# Patient Record
Sex: FEMALE | Race: WHITE
[De-identification: names, ages, dates, MRNs, and addresses within clinical notes are randomized per-mention and may not be internally consistent; named-entity substitution may affect disease eponyms.]

---

## 2017-05-06 ENCOUNTER — HOSPITAL ENCOUNTER (EMERGENCY)
Dept: HOSPITAL 74 - JER | Age: 82
Discharge: HOME | End: 2017-05-06
Payer: COMMERCIAL

## 2017-05-06 VITALS — HEART RATE: 69 BPM | DIASTOLIC BLOOD PRESSURE: 69 MMHG | SYSTOLIC BLOOD PRESSURE: 153 MMHG

## 2017-05-06 VITALS — BODY MASS INDEX: 32 KG/M2

## 2017-05-06 VITALS — TEMPERATURE: 97.8 F

## 2017-05-06 DIAGNOSIS — R26.2: ICD-10-CM

## 2017-05-06 DIAGNOSIS — M25.462: ICD-10-CM

## 2017-05-06 DIAGNOSIS — I10: ICD-10-CM

## 2017-05-06 DIAGNOSIS — Z85.3: ICD-10-CM

## 2017-05-06 DIAGNOSIS — M13.862: Primary | ICD-10-CM

## 2017-05-06 DIAGNOSIS — E03.9: ICD-10-CM

## 2017-05-06 LAB
ALBUMIN SERPL-MCNC: 3.7 G/DL (ref 3.4–5)
ALP SERPL-CCNC: 73 U/L (ref 45–117)
ALT SERPL-CCNC: 18 U/L (ref 12–78)
ANION GAP SERPL CALC-SCNC: 10 MMOL/L (ref 8–16)
AST SERPL-CCNC: 15 U/L (ref 15–37)
BASOPHILS # BLD: 0.9 % (ref 0–2)
BILIRUB SERPL-MCNC: 0.4 MG/DL (ref 0.2–1)
CALCIUM SERPL-MCNC: 9.3 MG/DL (ref 8.5–10.1)
CO2 SERPL-SCNC: 26 MMOL/L (ref 21–32)
COCKROFT - GAULT: 55.18
CREAT SERPL-MCNC: 0.9 MG/DL (ref 0.55–1.02)
DEPRECATED RDW RBC AUTO: 13.8 % (ref 11.6–15.6)
EOSINOPHIL # BLD: 2.4 % (ref 0–4.5)
GLUCOSE SERPL-MCNC: 96 MG/DL (ref 74–106)
INR BLD: 0.93 (ref 0.82–1.09)
MCH RBC QN AUTO: 29.4 PG (ref 25.7–33.7)
MCHC RBC AUTO-ENTMCNC: 33 G/DL (ref 32–36)
MCV RBC: 89.2 FL (ref 80–96)
NEUTROPHILS # BLD: 52.1 % (ref 42.8–82.8)
PLATELET # BLD AUTO: 217 K/MM3 (ref 134–434)
PMV BLD: 8.1 FL (ref 7.5–11.1)
PROT SERPL-MCNC: 7.7 G/DL (ref 6.4–8.2)
PT PNL PPP: 10.2 SEC (ref 9.98–11.88)
WBC # BLD AUTO: 8.2 K/MM3 (ref 4–10)

## 2017-05-06 PROCEDURE — 3E0333Z INTRODUCTION OF ANTI-INFLAMMATORY INTO PERIPHERAL VEIN, PERCUTANEOUS APPROACH: ICD-10-PCS | Performed by: EMERGENCY MEDICINE

## 2017-05-06 NOTE — PDOC
History of Present Illness





- General


History Source: Patient


Exam Limitations: No Limitations





- History of Present Illness


Initial Comments: 





05/06/17 01:23


The patient is a 87 year old female brought via EMS and presenting with her 

daughter, with a significant past medical history of hypothyroidism, 

hypertension and right breast CA s/p right lumpectomy, who presents to the 

emergency department with left knee pain and swelling. The patient notes that 

she has been having trouble bearing weight due to the leg pain. She denies any 

kind of fall. She describes her leg pain as moderate, without radiation. She 

notes that moving the extremity or attempting to bear weight on the extremity 

exacerbates her pain.  





The patient denies chest pain, shortness of breath, headache and dizziness. 

Denies fever, chills, nausea, vomit, diarrhea and constipation. Denies dysuria, 

frequency, urgency and hematuria. 





Allergies: Penicillin, dez, peaches


Past surgical history: Right lumpectomy 


Social history: No alcohol, tobacco or drug use reported 





<Devon Edwards - Last Filed: 05/06/17 02:07>





<Parrish Maria - Last Filed: 05/06/17 02:20>





<Michelle Condon - Last Filed: 05/06/17 06:52>





<Tere Correa - Last Filed: 05/06/17 10:34>





- General


Chief Complaint: Pain, Acute


Stated Complaint: PAIN


Time Seen by Provider: 05/06/17 00:39





Past History





<Devon Edwards - Last Filed: 05/06/17 02:07>





- Past Medical History


Cancer: Yes (BREAST, R LUMPECTOMY)


HTN: Yes


Thyroid Disease: Yes (hypo)





- Psycho/Social/Smoking Cessation Hx


Anxiety: No


Suicidal Ideation: No


Smoking History: Never smoked


Have you smoked in the past 12 months: No


Information on smoking cessation initiated: No


Hx Alcohol Use: No


Drug/Substance Use Hx: No


Substance Use Type: None





<Parrish Maria - Last Filed: 05/06/17 02:20>





<Michelle Condon - Last Filed: 05/06/17 06:52>





<Tere Correa - Last Filed: 05/06/17 10:34>





- Past Medical History


Allergies/Adverse Reactions: 


 Allergies











Allergy/AdvReac Type Severity Reaction Status Date / Time


 


Penicillins Allergy   Verified 05/06/17 00:43


 


nickel [Nickel] AdvReac Mild Rash Unverified 05/06/17 00:43


 


peniciliin Allergy Intermediate  Uncoded 05/06/17 00:43


 


Peaches  AdvReac Mild redness Uncoded 05/06/17 00:43





   Face  











Home Medications: 


Ambulatory Orders





Acetaminophen [Tylenol Extra Strength] 500 mg PO PRN 05/06/17 


Amlodipine Besylate [Norvasc -] 2.5 mg PO DAILY 05/06/17 


Levothyroxine [Synthroid -] 100 mcg PO DAILY 05/06/17 


Nebivolol HCl [Bystolic] 10 mg DAILY 05/06/17 











**Review of Systems





- Review of Systems


Able to Perform ROS?: Yes


Comments:: 





05/06/17 01:23


CONSTITUTIONAL: No fever, no chills, no fatigue


EYES: No visual changes


ENT: No ear pain, no sore throat


CARDIOVASCULAR: No chest pain, no palpitations


RESPIRATORY: No cough, no SOB


GI: No abdominal pain, no nausea, no vomiting, no constipation, no diarrhea


GENITOURINARY: No dysuria, no frequency, no hematuria


EXTREMITIES: (+) Left lower extremity pain and swelling. 


MUSKULOSKELETAL: No backpain, no joint pain, no myalgias


SKIN: No rash


NEURO: No headache











<Devon Edwards - Last Filed: 05/06/17 02:07>





*Physical Exam





- Vital Signs


 Last Vital Signs











Temp Pulse Resp BP Pulse Ox


 


 98.1 F   72   18   187/72   98 


 


 05/06/17 00:43  05/06/17 00:43  05/06/17 00:43  05/06/17 00:43  05/06/17 00:43














<Devon Edwards - Last Filed: 05/06/17 02:07>





- Vital Signs


 Last Vital Signs











Temp Pulse Resp BP Pulse Ox


 


 98.1 F   72   18   187/72   98 


 


 05/06/17 00:43  05/06/17 00:43  05/06/17 00:43  05/06/17 00:43  05/06/17 00:43














- Physical Exam


Comments: 





05/06/17 02:20








EXAMINATION 





CONSTITUTIONAL: Well-appearing; well-nourished; in no apparent distress


HEAD: Normocephalic; atraumatic


EYES: PERRL; EOM intact 


ENMT: External appears normal; normal oropharynx


NECK: Supple; non-tender; no cervical lymphadenopathy


CARD: Normal S1, S2; no murmurs, rubs, or gallops


RESP: Normal chest excursion with respiration; breath sounds clear and equal 

bilaterally; no wheezes, rhonchi, or rales


ABD: Soft, non-distended; non-tender; no palpable organomegaly, no palpable 

hernias


EXT: Left knee:  + There is a large suprapatellar effusion as well as mild 

effusion to the medial and lateral aspect of the knee joint, with tenderness to 

palpation along the medial and lateral aspects of the knee joint with no laxity 

with varus/Valgus; anterior drawers is negative; patient is unable to extend or 

flex the knee joint due to severe pain; there is normal passive range of motion 

at the left hip and ankle; distal pulses are intact bilaterally; right lower 

extremity: There is no soft tissue swelling or bony tenderness to palpation; 

full range of motion at the right hip, right knee and right ankle;


SKIN: Warm, dry, no rash


NEURO: No focal neurological deficiencies. 








<Parrish Maria - Last Filed: 05/06/17 02:20>





- Vital Signs


 Last Vital Signs











Temp Pulse Resp BP Pulse Ox


 


 98.1 F   72   18   187/72   98 


 


 05/06/17 00:43  05/06/17 00:43  05/06/17 00:43  05/06/17 00:43  05/06/17 00:43














<Michelle Condon - Last Filed: 05/06/17 06:52>





- Vital Signs


 Last Vital Signs











Temp Pulse Resp BP Pulse Ox


 


 97.8 F   68   18   129/79   99 


 


 05/06/17 06:51  05/06/17 08:43  05/06/17 08:43  05/06/17 08:43  05/06/17 08:43














<Tere Correa - Last Filed: 05/06/17 10:34>





ED Treatment Course





- LABORATORY


CBC & Chemistry Diagram: 


 05/06/17 01:50





 05/06/17 01:50





<Devon Edwards - Last Filed: 05/06/17 02:07>





- LABORATORY


CBC & Chemistry Diagram: 


 05/06/17 01:50





 05/06/17 01:50





<Yasmin Mariais - Last Filed: 05/06/17 02:20>





- LABORATORY


CBC & Chemistry Diagram: 


 05/06/17 01:50





 05/06/17 01:50





- ADDITIONAL ORDERS


Additional order review: 


 Laboratory  Results











  05/06/17 05/06/17 05/06/17





  01:50 01:50 01:50


 


INR    0.93


 


Sodium   141 


 


Potassium   4.4 


 


Chloride   105 


 


Carbon Dioxide   26 


 


Anion Gap   10 


 


BUN   22 H 


 


Creatinine   0.9  D 


 


Creat Clearance w eGFR   59.23 


 


Random Glucose   96 


 


Calcium   9.3 


 


Total Bilirubin   0.4  D 


 


AST   15 


 


ALT   18 


 


Alkaline Phosphatase   73  D 


 


Total Protein   7.7 


 


Albumin   3.7 


 


Blood Type  O POSITIVE  


 


Antibody Screen  Negative  








 











  05/06/17





  01:50


 


RBC  4.47


 


MCV  89.2


 


MCHC  33.0


 


RDW  13.8


 


MPV  8.1


 


Neutrophils %  52.1


 


Lymphocytes %  34.4


 


Monocytes %  10.2


 


Eosinophils %  2.4


 


Basophils %  0.9














- Medications


Given in the ED: 


ED Medications














Discontinued Medications














Generic Name Dose Route Start Last Admin





  Trade Name Freq  PRN Reason Stop Dose Admin


 


Acetaminophen  1,000 mg 05/06/17 01:16 05/06/17 01:58





  Ofirmev Injection -  IVPB 05/06/17 01:17  1,000 mg





  ONCE ONE   Administration














<Michelle Condon - Last Filed: 05/06/17 06:52>





- LABORATORY


CBC & Chemistry Diagram: 


 05/06/17 01:50





 05/06/17 01:50





- ADDITIONAL ORDERS


Additional order review: 


 Laboratory  Results











  05/06/17 05/06/17 05/06/17





  01:50 01:50 01:50


 


INR    0.93


 


Sodium   141 


 


Potassium   4.4 


 


Chloride   105 


 


Carbon Dioxide   26 


 


Anion Gap   10 


 


BUN   22 H 


 


Creatinine   0.9  D 


 


Creat Clearance w eGFR   59.23 


 


Random Glucose   96 


 


Calcium   9.3 


 


Total Bilirubin   0.4  D 


 


AST   15 


 


ALT   18 


 


Alkaline Phosphatase   73  D 


 


Total Protein   7.7 


 


Albumin   3.7 


 


Blood Type  O POSITIVE  


 


Antibody Screen  Negative  








 











  05/06/17





  01:50


 


RBC  4.47


 


MCV  89.2


 


MCHC  33.0


 


RDW  13.8


 


MPV  8.1


 


Neutrophils %  52.1


 


Lymphocytes %  34.4


 


Monocytes %  10.2


 


Eosinophils %  2.4


 


Basophils %  0.9














- Medications


Given in the ED: 


ED Medications














Discontinued Medications














Generic Name Dose Route Start Last Admin





  Trade Name David  PRN Reason Stop Dose Admin


 


Acetaminophen  1,000 mg 05/06/17 01:16 05/06/17 01:58





  Ofirmev Injection -  IVPB 05/06/17 01:17  1,000 mg





  ONCE ONE   Administration


 


Ketorolac Tromethamine  30 mg 05/06/17 03:26 05/06/17 03:30





  Toradol Injection -  IVPUSH 05/06/17 03:27  30 mg





  ONCE ONE   Administration


 


Lorazepam  1 mg 05/06/17 06:18 05/06/17 07:07





  Ativan Injection -  IVPUSH 05/06/17 06:19  1 mg





  ONCE ONE   Administration














<Tere Correa - Last Filed: 05/06/17 10:34>





Medical Decision Making





- Medical Decision Making





05/06/17 02:23


Patient is an elderly female who presents with atraumatic pain and swelling to 

the left knee, with inability to bear weight on the day of arrival. Physical 

evaluation reveals large suprapatellar effusion and and smaller medial and 

lateral effusions.  Will obtain left knee x-ray as well as left hip and pelvis x

-rays to rule out fracture. We'll administer IV Tylenol. Patient will likely 

require admission for evaluation of knee derangement and inability to ambulate.





<Parrish Maria - Last Filed: 05/06/17 02:20>





- Medical Decision Making





05/06/17 06:52


I received patient on signout.  She has left knee pain that has left her unable 

to ambulate. Knee XR shows some fluid in the knee. No fractures. Pt has warmth 

at the right knee and pain at the right knee, but no systemic fevers and no 

cellulitis of the right knee. Pt is awaiting CT scan result. She is also 

awaiting ultrasound this AM to r/o baker cyst, DVT.


She may require admission for inability to ambulate. 


Or she will require  for placement in a rehab center for knee pain 

and inability to ambulate. 


She will be signed out to the day ER team.





<Michelle Condon - Last Filed: 05/06/17 06:52>





- Medical Decision Making





05/06/17 10:33


MicroBlogged Hospitalist. 





<Tere Correa - Last Filed: 05/06/17 10:34>





*DC/Admit/Observation/Transfer





- Attestations


Scribe Attestion: 





05/06/17 01:24


Documentation prepared by Devon Edwards, acting as medical scribe for 

Parrish Maria MD





<Devon Edwards - Last Filed: 05/06/17 02:07>





- Attestations


Physician Attestion: 





05/06/17 02:20


The documentation was prepared by the scribe under my direct supervision. I 

have reviewed the documentation which correctly represents the findings, 

medical decision-making and critical action taken by me.





<Parrish Maria - Last Filed: 05/06/17 02:20>





<Michelle Condon - Last Filed: 05/06/17 06:52>





- Attestations


Scribe Attestion: 


05/06/17 10:33


Documentation prepared by Tere Correa, acting as medical scribe for 

Michelle Condon MD.





<Tere Correa - Last Filed: 05/06/17 10:34>


Diagnosis at time of Disposition: 


 Knee arthropathy, Inability to ambulate due to knee





- Discharge Dispostion


Condition at time of disposition: Guarded





- Referrals


Referrals: 


Jan Laird MD [Primary Care Provider] -

## 2017-05-06 NOTE — PDOC
*Physical Exam





- Vital Signs


 Last Vital Signs











Temp Pulse Resp BP Pulse Ox


 


 97.8 F   68   18   129/79   99 


 


 05/06/17 06:51  05/06/17 08:43  05/06/17 08:43  05/06/17 08:43  05/06/17 08:43














ED Treatment Course





- LABORATORY


CBC & Chemistry Diagram: 


 05/06/17 01:50





 05/06/17 01:50





- ADDITIONAL ORDERS


Additional order review: 


 Laboratory  Results











  05/06/17 05/06/17 05/06/17





  01:50 01:50 01:50


 


INR    0.93


 


Sodium   141 


 


Potassium   4.4 


 


Chloride   105 


 


Carbon Dioxide   26 


 


Anion Gap   10 


 


BUN   22 H 


 


Creatinine   0.9  D 


 


Creat Clearance w eGFR   59.23 


 


Random Glucose   96 


 


Calcium   9.3 


 


Total Bilirubin   0.4  D 


 


AST   15 


 


ALT   18 


 


Alkaline Phosphatase   73  D 


 


Total Protein   7.7 


 


Albumin   3.7 


 


Blood Type  O POSITIVE  


 


Antibody Screen  Negative  








 











  05/06/17





  01:50


 


RBC  4.47


 


MCV  89.2


 


MCHC  33.0


 


RDW  13.8


 


MPV  8.1


 


Neutrophils %  52.1


 


Lymphocytes %  34.4


 


Monocytes %  10.2


 


Eosinophils %  2.4


 


Basophils %  0.9














- Medications


Given in the ED: 


ED Medications














Discontinued Medications














Generic Name Dose Route Start Last Admin





  Trade Name Gilq  PRN Reason Stop Dose Admin


 


Acetaminophen  1,000 mg 05/06/17 01:16 05/06/17 01:58





  Ofirmev Injection -  IVPB 05/06/17 01:17  1,000 mg





  ONCE ONE   Administration


 


Ketorolac Tromethamine  30 mg 05/06/17 03:26 05/06/17 03:30





  Toradol Injection -  IVPUSH 05/06/17 03:27  30 mg





  ONCE ONE   Administration


 


Lorazepam  1 mg 05/06/17 06:18 05/06/17 07:07





  Ativan Injection -  IVPUSH 05/06/17 06:19  1 mg





  ONCE ONE   Administration














Progress Note





- Progress Note


Progress Note: 


This patient was endorsed to me at 7 AM by Dr. Oshea pending results of lower 

extremity ultrasound, CT scan of the knee and reevaluation. Those studies are 

all negative for acute traumatic injury or any findings. There is a small cyst 

in the popliteal fossa. I have spoken with the patient and her daughter and 

have reevaluated the patient personally this morning. The patient was able to 

ambulate with some assistance but complained of persistent pain. Social work 

was called for evaluation for surfaces and possible placement.





Addendum:  NH placement is not a viable option for the family at this time.  

The plan is to discharge the patient home with Rx for ibuprofen.  Will refer to 

ortho as outpatient.  Return to the ED if S xpersist, worsen or new Sx arise.





*DC/Admit/Observation/Transfer


Diagnosis at time of Disposition: 


 Knee arthropathy, Inability to ambulate due to knee





- Discharge Dispostion


Disposition: HOME


Condition at time of disposition: Stable


Admit: No





- Referrals


Referrals: 


Jan Laird MD [Primary Care Provider] - 





- Patient Instructions


Printed Discharge Instructions:  [facility] for Knee Pain


Additional Instructions: 


You may take ibuprofen 600 mg every 6-8 hours as needed for pain in your knee. 

He may follow-up with Dr. Barton (orthopedics)call for an appointment at [phone]. Return to the emergency department if your symptoms persist, worsen, or 

new symptoms arise.





- Post Discharge Activity

## 2017-05-08 ENCOUNTER — HOSPITAL ENCOUNTER (OUTPATIENT)
Dept: HOSPITAL 74 - FER | Age: 82
Setting detail: OBSERVATION
LOS: 2 days | Discharge: HOME HEALTH | End: 2017-05-10
Attending: NURSE PRACTITIONER | Admitting: INTERNAL MEDICINE
Payer: COMMERCIAL

## 2017-05-08 VITALS — BODY MASS INDEX: 29.6 KG/M2

## 2017-05-08 DIAGNOSIS — Z85.3: ICD-10-CM

## 2017-05-08 DIAGNOSIS — R13.10: ICD-10-CM

## 2017-05-08 DIAGNOSIS — R26.2: ICD-10-CM

## 2017-05-08 DIAGNOSIS — M17.12: Primary | ICD-10-CM

## 2017-05-08 DIAGNOSIS — K57.00: ICD-10-CM

## 2017-05-08 DIAGNOSIS — E03.9: ICD-10-CM

## 2017-05-08 DIAGNOSIS — I10: ICD-10-CM

## 2017-05-08 DIAGNOSIS — M35.3: ICD-10-CM

## 2017-05-08 LAB
ALBUMIN SERPL-MCNC: 3.8 G/DL (ref 3.5–5)
ALP SERPL-CCNC: 49 U/L (ref 32–92)
ALT SERPL-CCNC: 12 U/L (ref 10–40)
ANION GAP SERPL CALC-SCNC: 8 MMOL/L (ref 8–16)
AST SERPL-CCNC: 19 U/L (ref 10–42)
BASOPHILS # BLD: 1.2 % (ref 0–2)
BILIRUB SERPL-MCNC: 0.4 MG/DL (ref 0.2–1)
CALCIUM SERPL-MCNC: 9.3 MG/DL (ref 8.4–10.2)
CO2 SERPL-SCNC: 24 MMOL/L (ref 22–28)
COCKROFT - GAULT: 57.47
COLOR UR: YELLOW
CREAT SERPL-MCNC: 0.8 MG/DL (ref 0.6–1.3)
DEPRECATED RDW RBC AUTO: 13.3 % (ref 11.6–15.6)
EOSINOPHIL # BLD: 3.6 % (ref 0–4.5)
GLUCOSE SERPL-MCNC: 90 MG/DL (ref 74–106)
MCH RBC QN AUTO: 29.9 PG (ref 25.7–33.7)
MCHC RBC AUTO-ENTMCNC: 33.9 G/DL (ref 32–36)
MCV RBC: 88.3 FL (ref 80–96)
NEUTROPHILS # BLD: 57.5 % (ref 42.8–82.8)
PH UR: 5.5 [PH] (ref 4.5–8)
PLATELET # BLD AUTO: 250 K/MM3 (ref 134–434)
PMV BLD: 7.9 FL (ref 7.5–11.1)
PROT SERPL-MCNC: 7.5 G/DL (ref 6.4–8.3)
SP GR UR: 1.02 (ref 1–1.02)
UROBILINOGEN UR STRIP-MCNC: (no result) MG/DL (ref 0.2–1)
WBC # BLD AUTO: 8.8 K/MM3 (ref 4–10.8)

## 2017-05-08 PROCEDURE — 3E013GC INTRODUCTION OF OTHER THERAPEUTIC SUBSTANCE INTO SUBCUTANEOUS TISSUE, PERCUTANEOUS APPROACH: ICD-10-PCS | Performed by: NURSE PRACTITIONER

## 2017-05-08 PROCEDURE — 3E0333Z INTRODUCTION OF ANTI-INFLAMMATORY INTO PERIPHERAL VEIN, PERCUTANEOUS APPROACH: ICD-10-PCS | Performed by: NURSE PRACTITIONER

## 2017-05-08 PROCEDURE — G0378 HOSPITAL OBSERVATION PER HR: HCPCS

## 2017-05-08 RX ADMIN — AMLODIPINE BESYLATE SCH MG: 2.5 TABLET ORAL at 22:24

## 2017-05-08 NOTE — PDOC
History of Present Illness





- General


Chief Complaint: Pain


Stated Complaint: LEFT KNEE PAIN X 1 WK


Time Seen by Provider: 05/08/17 12:43


History Source: Patient, Family


Exam Limitations: No Limitations





- History of Present Illness


Initial Comments: 





05/08/17 13:47





87-year-old female history of thyroid disorder, hypertension, right breast CA 

status post right lumpectomy presents with persistent left knee pain. Patient 

was here on May 4 after potentially twisting her left knee. Was noted to have 

left knee swelling and pain which in ultrasound, x-ray and CAT scan the knee 

was obtained. Findings found a joint effusion but no other findings. Patient 

was discharged. However, patient has been unable to actively or bear weight on 

the left knee. The patient's daughter is having difficulty caring for the 

patient. Reports that given the persistent pain and difficulty care, patient 

came to the ED for further evaluation.





Past History





- Past Medical History


Allergies/Adverse Reactions: 


 Allergies











Allergy/AdvReac Type Severity Reaction Status Date / Time


 


Penicillins Allergy   Verified 05/08/17 12:43


 


nickel [Nickel] AdvReac Mild Rash Verified 05/08/17 12:43


 


peniciliin Allergy Intermediate  Uncoded 05/08/17 12:43


 


Peaches  AdvReac Mild redness Uncoded 05/08/17 12:43





   Face  











Home Medications: 


Ambulatory Orders





Acetaminophen [Tylenol Extra Strength] 500 mg PO PRN 05/06/17 


Amlodipine Besylate [Norvasc -] 2.5 mg PO DAILY 05/06/17 


Levothyroxine [Synthroid -] 100 mcg PO DAILY 05/06/17 


Nebivolol HCl [Bystolic] 10 mg DAILY 05/06/17 








Cancer: Yes (BREAST, R LUMPECTOMY)


HTN: Yes


Thyroid Disease: Yes (hypo)





- Immunization History


Immunization Up to Date: Yes





- Psycho/Social/Smoking Cessation Hx


Anxiety: No


Suicidal Ideation: No


Smoking History: Never smoked


Have you smoked in the past 12 months: No


Hx Alcohol Use: Yes (RARE)


Drug/Substance Use Hx: No


Substance Use Type: None





**Review of Systems





- Review of Systems


Able to Perform ROS?: Yes


Comments:: 





05/08/17 13:50





GENERAL/CONSTITUTIONAL: No fever, weakness. 


HEAD, EYES, EARS, NOSE AND THROAT: No change in vision. No ear pain or 

discharge. No sore throat.


CARDIOVASCULAR: No chest pain or shortness of breath.


RESPIRATORY: No cough, wheezing, or hemoptysis.


GASTROINTESTINAL: No abdominal pain, nausea, vomiting, diarrhea, or decreased 

PO intolerance. 


GENITOURINARY: No dysuria, frequency, or change in urination.


MUSCULOSKELETAL: +left knee pain and swelling


SKIN: No rash


NEUROLOGIC: No headache, vertigo, loss of consciousness, or change in strength/

sensation.


ENDOCRINE: No increased thirst. No abnormal weight change.


HEMATOLOGIC/LYMPHATIC: No anemia, easy bleeding, or history of blood clots.


ALLERGIC/IMMUNOLOGIC: No hives or skin allergy. 





*Physical Exam





- Vital Signs


 Last Vital Signs











Temp Pulse Resp BP Pulse Ox


 


 97.9 F   63   16   152/65   95 


 


 05/08/17 12:42  05/08/17 12:42  05/08/17 12:42  05/08/17 12:42  05/08/17 12:42














- Physical Exam


Comments: 





05/08/17 13:50





GENERAL: Awake, alert, and fully oriented, in no acute distress. 


HEAD: No signs of trauma


EYES: PERRLA, EOMI, sclera anicteric, conjunctiva clear


ENT: Auricles normal inspection, hearing grossly normal, nares patent, 

oropharynx clear without exudates.  


NECK: Normal ROM, supple, no lymphadenopathy, JVD, or masses


LUNGS: Breath sounds equal, clear to auscultation bilaterally.  No wheezes, and 

no crackles


HEART: Regular rate and rhythm, normal S1 and S2, no murmurs, rubs or gallops


ABDOMEN: Soft, nontender, normoactive bowel sounds.  No guarding, no rebound.  

No masses


EXTREMITIES: Normal range of motion, no edema.  No clubbing or cyanosis. No 

cords, erythema, or tenderness


LLE: 2+ DP pulse. sensation intact throughout. Joint effusion appreciated. Able 

to flex and extend approx 45 degrees without difficult. Not warm or red to 

touch. Varus and valgus stress negative. Anterior/Posterior drawer test 

negative.


NEUROLOGICAL: Cranial nerves II through XII grossly intact.  Normal speech, 

normal gait


SKIN: Warm, Dry, normal turgor, no rashes or lesions noted.





**Heart Score/ECG Review


  ** #1


ECG reviewed & interpreted by me at: 13:50





05/08/17 15:19


NSR 59, TWI III, avF, TWI V3, no std/tierra,  msec





ED Treatment Course





- LABORATORY


CBC & Chemistry Diagram: 


 05/08/17 13:45





 05/08/17 13:45





Medical Decision Making





- Medical Decision Making


05/08/17 13:52





 Vital Signs











Temp Pulse Resp BP Pulse Ox


 


 97.9 F   63   16   152/65   95 


 


 05/08/17 12:42  05/08/17 12:42  05/08/17 12:42  05/08/17 12:42  05/08/17 12:42








Pt unable to bear weight. Patient will be unable to care at home without 

support. Will need admission for PT, ortho and possible SNF evaluation.





05/08/17 15:25





 CBC, BMP





 05/08/17 13:45 





 05/08/17 13:45 





 CMP











Sodium  138 mmol/L (136-145)   05/08/17  13:45    


 


Potassium  4.6 mmol/L (3.5-5.1)   05/08/17  13:45    


 


Chloride  106 mmol/L ()   05/08/17  13:45    


 


Carbon Dioxide  24 mmol/L (22-28)   05/08/17  13:45    


 


Anion Gap  8  (8-16)   05/08/17  13:45    


 


BUN  20 mg/dl (7-18)  H D 05/08/17  13:45    


 


Creatinine  0.8 mg/dl (0.6-1.3)   05/08/17  13:45    


 


Creat Clearance w eGFR  > 60  (>60)   05/08/17  13:45    


 


Random Glucose  90 mg/dl ()   05/08/17  13:45    


 


Calcium  9.3 mg/dl (8.4-10.2)   05/08/17  13:45    


 


Total Bilirubin  0.4 mg/dl (0.2-1.0)  D 05/08/17  13:45    


 


AST  19 U/L (10-42)   05/08/17  13:45    


 


ALT  12 U/L (10-40)   05/08/17  13:45    


 


Alkaline Phosphatase  49 U/L (32-92)   05/08/17  13:45    


 


Total Protein  7.5 g/dl (6.4-8.3)   05/08/17  13:45    


 


Albumin  3.8 g/dl (3.5-5.0)   05/08/17  13:45    








Case discussed with DR. Laird. He agrees that the patient needs ortho, and PT 

evaluation.


Case discussed with DR. Yñaez. She accepts the patient to med/surg obs to her 

service.





Case discussed in detail with admitting physician including history, physical 

exam and ancillary studies.





Admitting physician has assumed care for the patient, will follow all pending 

diagnostics and will complete the evaluation and treatment.  





*DC/Admit/Observation/Transfer


Diagnosis at time of Disposition: 


 Inability to ambulate due to knee





- Discharge Dispostion


Condition at time of disposition: Stable


Admit: Yes

## 2017-05-09 RX ADMIN — NAPROXEN SCH MG: 500 TABLET ORAL at 21:20

## 2017-05-09 RX ADMIN — ANALGESIC BALM SCH APPLIC: 1.74; 4.06 OINTMENT TOPICAL at 21:21

## 2017-05-09 RX ADMIN — LEVOTHYROXINE SODIUM SCH MCG: 100 TABLET ORAL at 06:23

## 2017-05-09 RX ADMIN — ANALGESIC BALM SCH 500S: 1.74; 4.06 OINTMENT TOPICAL at 09:25

## 2017-05-09 RX ADMIN — RANITIDINE SCH MG: 150 TABLET ORAL at 21:20

## 2017-05-09 RX ADMIN — NEBIVOLOL HYDROCHLORIDE SCH MG: 10 TABLET ORAL at 10:00

## 2017-05-09 RX ADMIN — RANITIDINE SCH MG: 150 TABLET ORAL at 12:45

## 2017-05-09 RX ADMIN — HEPARIN SODIUM SCH UNIT: 5000 INJECTION, SOLUTION INTRAVENOUS; SUBCUTANEOUS at 21:20

## 2017-05-09 RX ADMIN — AMLODIPINE BESYLATE SCH MG: 2.5 TABLET ORAL at 21:21

## 2017-05-09 NOTE — PN
Physical Exam: 


SUBJECTIVE: Patient seen and examined, reports pain to the left knee upon 

movement.  





OBJECTIVE: patient is a 87 year old female with a past medical history of HTN, 

hypothyroid, R breast CA s/p lumpectomy.  patient was admitted from the 

emergency department to observation for left knee pain and the inability to 

ambulate.  





 Vital Signs











 Period  Temp  Pulse  Resp  BP Sys/Hui  Pulse Ox


 


 Last 24 Hr  97.4 F-99.4 F  63-66  18-20  135-173/50-76  97-98











GENERAL: The patient is awake, alert, and fully oriented, in no acute distress.


HEAD: Normal with no signs of trauma.


EYES: PERRL, extraocular movements intact, sclera anicteric, conjunctiva clear. 

No ptosis. 


ENT: Ears normal, nares patent, oropharynx clear without exudates, moist mucous 


membranes.


NECK: Trachea midline, full range of motion, supple. 


LUNGS: Breath sounds equal, clear to auscultation bilaterally, no wheezes, no 

crackles, no 


accessory muscle use. 


HEART: Regular rate and rhythm, S1, S2 without murmur, rub or gallop.


ABDOMEN: Soft, nontender, nondistended, normoactive bowel sounds, no guarding, 

no 


rebound, no hepatosplenomegaly, no masses.


EXTREMITIES: 2+ pulses, warm, well-perfused, no edema. multiple varicosities 

noted


LEFT LOWER EXTREMITY: pain  upon flexion and extension of left knee, +milagro'

s test 


NEUROLOGICAL: Cranial nerves II through XII grossly intact. Normal speech, gait 

not 


observed.


PSYCH: Normal mood, normal affect.


SKIN: Warm, dry, normal turgor, no rashes or lesions noted





               Active Medications











Generic Name Dose Route Start Last Admin





  Trade Name Freq  PRN Reason Stop Dose Admin


 


Acetaminophen  650 mg 05/08/17 21:37 05/08/17 22:24





  Tylenol -  PO   650 mg





  Q6H PRN   Administration





  FEVER OR PAIN   


 


Amlodipine Besylate  2.5 mg 05/08/17 22:00 05/08/17 22:24





  Norvasc -  PO   2.5 mg





  HS JACOB   Administration


 


Artificial Tears  1 drop 05/08/17 21:38  





  Artificial Tears  OU   





  QID PRN   





  DRY EYES   


 


Heparin Sodium (Porcine)  5,000 unit 05/09/17 22:00  





  Heparin -  SQ   





  TID JACOB   


 


Levothyroxine Sodium  100 mcg 05/09/17 07:00 05/09/17 06:23





  Synthroid -  PO   100 mcg





  DAILY@0700 JACOB   Administration


 


Methyl Salicylate  1 applic 05/09/17 10:00  





  Shashi-Brunner -  TP   





  BID JACOB   


 


Nebivolol  10 mg 05/09/17 10:00  





  Bystolic -  PO   





  DAILY JACOB   








IMAGING





5/6/17: US/DUPLEX VASCUL US-1 LEG HISTORY PROVIDED: IMPRESSION: No evidence of 

deep venous thrombosis. 





5/6/17: CT/LOWER EXTREMITY CT W/O CONTR IMPRESSION: Mild degenerative arthritis 

with small suprapatellar effusion. There is no evidence of fracture, 

dislocation or acute bone or joint abnormalities. 





5/6/17: RAD/HIP PELVIS-LEFT Pelvis and left hip:  Impression: No acute 

pathology.





5/6/17: RAD/KNEE 2 POS-LEFT Left knee: Impression : Joint effusion. No acute 

fracture appreciated.





5/8/17: RAD/CHEST X-RAY PORTABLE*  Impression: No significant interval change 

or acute lung disease is present. 





ASSESSMENT/PLAN:





1)L knee osteoarthritis with severe pain


- pending ortho consult


- pt eval


- continue prn tylenol 


      


2) HTN


- continue norvasc, home dose


- b/p at goal 


   


3) Hypothyroid


- continue synthroid 





F/E/N


- low sodium diet


- replete lytes prn





PPX


- heparin sq tid


- pt eval


- scd/adarsh








Dispo: Pt currently requires inpatient observation for management of her 

emergent condition.








Visit type





- Emergency Visit


Emergency Visit: Yes


ED Registration Date: 05/08/17


Care time: The patient presented to the Emergency Department on the above date 

and was hospitalized for further evaluation of their emergent condition.





- New Patient


This patient is new to me today: Yes


Date on this admission: 05/09/17





- Critical Care


Critical Care patient: No





- Discharge Referral


Referred to [facility] No

## 2017-05-09 NOTE — CONSULT
Consult


Reason for Consultation:: Left knee





- History of Present Illness


Chief Complaint: Left knee pain 1 month


History of Present Illness: 


87-year-old female complains of left knee pain for approximately 1 month.  She 

states over the past few weeks this pain is worse and a few days ago she 

twisted the knee and was having a lot of pain and can barely walk. she went to 

the emergency room and had x-rays and CAT scan of the knee and was feeling 

better and was discharged home.  She is feeling a little better the past few 

days but the pain got worse again yesterday and she went back to the emergency 

room and was admitted.  Today the knee is feeling a little better and she was 

able to walk the hallway and back with a walker and help of the physical 

therapist.  She did have a similar problem with right knee a few years ago 

which eventually resolved after a few weeks.  She has not been taking any anti-

inflammatories for the knee.There are no other associated, aggravating or 

relieving factors. 





- History Source


History Provided By: Patient, Family Member, Medical Record


Limitations to Obtaining History: No Limitations





- Past Medical History


Cardio/Vascular: Yes: HTN, Hyperlipdemia


Gastrointestinal: Yes: Other (Dysphagia, being worked up by GI.)


...Pregnant: No


Musculoskeletal: Yes: Osteoarthritis


Endocrine: Yes: Hypothyroidism





- Past Surgical History


Past Surgical History: Yes: Breast Biopsy (History of right breast carcinome in 

1996 treated with lumpectomy, radiotherapy and Tamoxiphen)





- Alcohol/Substance Use


Hx Alcohol Use: Yes (RARE)





- Smoking History


Smoking history: Never smoked


Have you smoked in the past 12 months: No





- Social History


ADL: Independent


History of Recent Travel: No





Home Medications





- Allergies


Allergies/Adverse Reactions: 


 Allergies











Allergy/AdvReac Type Severity Reaction Status Date / Time


 


cortisone Allergy Intermediate Swelling Verified 05/08/17 20:29


 


Penicillins Allergy   Verified 05/08/17 12:43


 


nickel [Nickel] AdvReac Mild Rash Verified 05/08/17 12:43


 


peniciliin Allergy Intermediate  Uncoded 05/08/17 12:43


 


Peaches  AdvReac Mild redness Uncoded 05/08/17 12:43





   Face  














- Home Medications


Home Medications: 


Ambulatory Orders





Acetaminophen [Tylenol Extra Strength] 500 mg PO PRN 05/06/17 


Amlodipine Besylate [Norvasc -] 2.5 mg PO DAILY 05/06/17 


Levothyroxine [Synthroid -] 100 mcg PO DAILY 05/06/17 


Nebivolol HCl [Bystolic] 10 mg DAILY 05/06/17 











Family Disease History





- Family Disease History


Family Disease History: Diabetes: Brother, CA: Sister (Colon cancer)





Review of Systems





- Review of Systems


Constitutional: reports: No Symptoms


Eyes: reports: No Symptoms


HENT: reports: No Symptoms


Neck: reports: No Symptoms


Cardiovascular: reports: No Symptoms


Respiratory: reports: No Symptoms


Gastrointestinal: reports: No Symptoms


Genitourinary: reports: No Symptoms


Breasts: reports: No Symptoms Reported


Musculoskeletal: reports: No Symptoms


Integumentary: reports: No Symptoms


Neurological: reports: No Symptoms


Endocrine: reports: No Symptoms


Hematology/Lymphatic: reports: No Symptoms


Psychiatric: reports: No Symptoms





Physical Exam


Vital Signs: 


 Vital Signs











Temperature  99.4 F   05/09/17 06:00


 


Pulse Rate  63   05/09/17 06:00


 


Respiratory Rate  20   05/09/17 06:00


 


Blood Pressure  135/76   05/09/17 06:00


 


O2 Sat by Pulse Oximetry (%)  98   05/09/17 06:00











Constitutional: Yes: Well Nourished, No Distress, Calm


HENT: Yes: Atraumatic, Normocephalic


Musculoskeletal: Yes: Other (Left knee: No open wounds.  Moderate joint 

effusion.  No erythema or ecchymosis.  Mild diffuse tenderness over the medial 

lateral joint spaces.  No other areas of tenderness.  Range of motion of the 

knee is 10 to 90. quadriceps is intact.  Calf nontender.  Negative Homans 

sign.  Compartments soft.)





Imaging





- Results


X-ray: Report Reviewed, Image Reviewed


Cat Scan: Report Reviewed, Image Reviewed (Severe degenerative changes. No 

fractures or dislocations)





Assessment/Plan


#1 left knee pain





Discussed today's findings and treatment options with the patient.  The knee is 

feeling a little better today and I recommended starting a course of oral anti-

inflammatories as well as a supportive wrap for the knee. I have recommended an 

NSAID with an antacid to minimize gastric irritation. We have discussed the 

possibility of doing a cortisone injection but she would like to hold off for 

now.  Follow-up with Dr. Chino/Dr. Zamarripa in 1 week as outpatient. She will 

also rest and ice the knee.

## 2017-05-09 NOTE — EKG
Test Reason : 

Blood Pressure : ***/*** mmHG

Vent. Rate : 059 BPM     Atrial Rate : 059 BPM

   P-R Int : 172 ms          QRS Dur : 084 ms

    QT Int : 442 ms       P-R-T Axes : 022 -13 -21 degrees

   QTc Int : 437 ms

 

SINUS BRADYCARDIA

CANNOT RULE OUT ANTERIOR INFARCT , AGE UNDETERMINED

NONSPECIFIC T WAVE ABNORMALITY

WHEN COMPARED WITH ECG OF 03-AUG-2007 07:34,

T WAVE INVERSION NOW EVIDENT IN INFERIOR LEADS

T WAVE INVERSION NO LONGER EVIDENT IN ANTERIOR LEADS

QT HAS SHORTENED

Confirmed by MD SANDRA, JOSE LUIS ([id number]) on 5/9/2017 9:04:32 AM

 

Referred By:  ZAIDA           Confirmed By:JOSE LUIS FLORES MD

## 2017-05-10 VITALS — SYSTOLIC BLOOD PRESSURE: 155 MMHG | HEART RATE: 66 BPM | TEMPERATURE: 98.9 F | DIASTOLIC BLOOD PRESSURE: 57 MMHG

## 2017-05-10 RX ADMIN — RANITIDINE SCH MG: 150 TABLET ORAL at 10:07

## 2017-05-10 RX ADMIN — ANALGESIC BALM SCH APPLIC: 1.74; 4.06 OINTMENT TOPICAL at 10:07

## 2017-05-10 RX ADMIN — NEBIVOLOL HYDROCHLORIDE SCH MG: 10 TABLET ORAL at 10:07

## 2017-05-10 RX ADMIN — NAPROXEN SCH MG: 500 TABLET ORAL at 10:07

## 2017-05-10 RX ADMIN — LEVOTHYROXINE SODIUM SCH MCG: 100 TABLET ORAL at 06:42

## 2017-05-10 RX ADMIN — HEPARIN SODIUM SCH UNIT: 5000 INJECTION, SOLUTION INTRAVENOUS; SUBCUTANEOUS at 06:42

## 2017-05-10 NOTE — DS
Physical Exam: 


SUBJECTIVE: Patient seen and examined, patient reports feeling better, reports 

pain improved after starting naproxyn.  





OBJECTIVE: patient is a 87 year old female with a past medical history of HTN, 

hypothyroid, R breast CA s/p lumpectomy presented with left knee pain. Pt 

presented to the ED on 5/6/17 with same complaint. Pt reports she was in her 

kitchen and went to turn around, twisting her left leg with sudden onset of 

pain persisting since. Pt underwent L hip/pelvis and knee xrays without 

fracture. CT left knee without fracture, + suprapatellar effusion. US negative 

for DVT. Pt has been unable to weight bear since event and daughter unable to 

manage mother at home. 





 Vital Signs











 Period  Temp  Pulse  Resp  BP Sys/Hui  Pulse Ox


 


 Last 24 Hr  97.6 F-98.8 F  60-64  16-19  127-158/59-63  97-98








PHYSICAL EXAM





GENERAL: The patient is awake, alert, and fully oriented, in no acute distress.


HEAD: Normal with no signs of trauma.


EYES: PERRL, extraocular movements intact, sclera anicteric, conjunctiva clear. 

No ptosis. 


ENT: Ears normal, nares patent, oropharynx clear without exudates, moist mucous 


membranes.


NECK: Trachea midline, full range of motion, supple. 


LUNGS: Breath sounds equal, clear to auscultation bilaterally, no wheezes, no 

crackles, no 


accessory muscle use. 


HEART: Regular rate and rhythm, S1, S2 without murmur, rub or gallop.


ABDOMEN: Soft, nontender, nondistended, normoactive bowel sounds, no guarding, 

no 


rebound, no hepatosplenomegaly, no masses.


EXTREMITIES: 2+ pulses, warm, well-perfused, no edema. multiple varicosities 

noted


LEFT LOWER EXTREMITY: pain  upon flexion and extension of left knee, +milagro'

s test 


NEUROLOGICAL: Cranial nerves II through XII grossly intact. Normal speech, gait 

not 


observed.


PSYCH: Normal mood, normal affect.


SKIN: Warm, dry, normal turgor, no rashes or lesions noted





LABS





 CBC











WBC  8.8 K/mm3 (4.0-10.8)   05/08/17  13:45    


 


RBC  4.78 M/mm3 (3.60-5.2)   05/08/17  13:45    


 


Hgb  14.3 GM/dl (10.7-15.3)   05/08/17  13:45    


 


Hct  42.2 % (32.4-45.2)   05/08/17  13:45    


 


MCV  88.3 fl (80-96)   05/08/17  13:45    


 


MCHC  33.9 g/dl (32.0-36.0)   05/08/17  13:45    


 


RDW  13.3 % (11.6-15.6)   05/08/17  13:45    


 


Plt Count  250 K/MM3 (134-434)   05/08/17  13:45    


 


MPV  7.9 fl (7.5-11.1)   05/08/17  13:45    


 


Neutrophils %  57.5 % (42.8-82.8)   05/08/17  13:45    


 


Lymphocytes %  28.8 % (8-40)   05/08/17  13:45    


 


Monocytes %  8.9 % (3.8-10.2)   05/08/17  13:45    


 


Eosinophils %  3.6 % (0-4.5)   05/08/17  13:45    


 


Basophils %  1.2 % (0-2.0)   05/08/17  13:45    








 CMP











Sodium  138 mmol/L (136-145)   05/08/17  13:45    


 


Potassium  4.6 mmol/L (3.5-5.1)   05/08/17  13:45    


 


Chloride  106 mmol/L ()   05/08/17  13:45    


 


Carbon Dioxide  24 mmol/L (22-28)   05/08/17  13:45    


 


Anion Gap  8  (8-16)   05/08/17  13:45    


 


BUN  20 mg/dl (7-18)  H D 05/08/17  13:45    


 


Creatinine  0.8 mg/dl (0.6-1.3)   05/08/17  13:45    


 


Creat Clearance w eGFR  > 60  (>60)   05/08/17  13:45    


 


Random Glucose  90 mg/dl ()   05/08/17  13:45    


 


Calcium  9.3 mg/dl (8.4-10.2)   05/08/17  13:45    


 


Total Bilirubin  0.4 mg/dl (0.2-1.0)  D 05/08/17  13:45    


 


AST  19 U/L (10-42)   05/08/17  13:45    


 


ALT  12 U/L (10-40)   05/08/17  13:45    


 


Alkaline Phosphatase  49 U/L (32-92)   05/08/17  13:45    


 


Total Protein  7.5 g/dl (6.4-8.3)   05/08/17  13:45    


 


Albumin  3.8 g/dl (3.5-5.0)   05/08/17  13:45    











IMAGING





5/6/17: US/DUPLEX VASCUL US-1 LEG HISTORY PROVIDED: IMPRESSION: No evidence of 

deep venous thrombosis. 





5/6/17: CT/LOWER EXTREMITY CT W/O CONTR IMPRESSION: Mild degenerative arthritis 

with small suprapatellar effusion. There is no evidence of fracture, 

dislocation or acute bone or joint abnormalities. 





5/6/17: RAD/HIP PELVIS-LEFT Pelvis and left hip:  Impression: No acute 

pathology.





5/6/17: RAD/KNEE 2 POS-LEFT Left knee: Impression : Joint effusion. No acute 

fracture appreciated.





5/8/17: RAD/CHEST X-RAY PORTABLE*  Impression: No significant interval change 

or acute lung disease is present. 








HOSPITAL COURSE:





   patient was admitted from the emergency department for left knee 

osteoarthritis with severe pain.  She was evaluated by orthopedist.  patient 

did decline cortisone injection.  She was started on NSAIDS as per the 

recommendation of the orthopedist.  Patient was evaluated by physical therapy, 

patient can ambulate with the assistance of a rolling walker and one person. 

patient has a past medical history of hypertension and norvasc was continued 

home dose. She has a past medical history of hypothyroidism and synthroid was 

continued at home dose.  











Date of Admission:05/08/17





Date of Discharge: 05/10/17











Minutes to complete discharge: 45





Discharge Summary


Reason For Visit: LEFT KNEE PAIN


Current Active Problems





Advanced directives, counseling/discussion (Acute) 


Constipation (Acute) 


DVT prophylaxis (Acute) 


Dysphagia (Acute) 


Hypertension (Acute) 


Hypothyroidism (Acute) 


Inability to ambulate due to knee (Acute) 


Osteoarthritis (Acute) 


Polymyalgia rheumatica (Acute) 








Condition: Improved





- Instructions


Diet, Activity, Other Instructions: 


resume regular low sodium diet


continue naporxyn for pain as needed


please take naproxyn with food 


continue taking zantac while taking naproxyn


continue all medications as prescribed 


use ace bandage to left knee, while awake


please follow up with the orthopedist within 1 week  


Referrals: 


Jan Laird MD [Primary Care Provider] - 


Disposition: VNS/HOME HEALTH CARE





- Home Medications


Comprehensive Discharge Medication List: 


Ambulatory Orders





Acetaminophen [Tylenol Extra Strength] 500 mg PO PRN 05/06/17 


Amlodipine Besylate [Norvasc -] 2.5 mg PO DAILY 05/06/17 


Levothyroxine [Synthroid -] 100 mcg PO DAILY 05/06/17 


Nebivolol HCl [Bystolic] 10 mg DAILY 05/06/17 








This patient is new to me today: No


Emergency Visit: Yes


ED Registration Date: 05/08/17


Care time: The patient presented to the Emergency Department on the above date 

and was hospitalized for further evaluation of their emergent condition.


Critical Care patient: No





- Discharge Referral


Referred to [facility] Yes


Physician Referral: Jan Laird MD (Int Med)

## 2018-12-04 ENCOUNTER — HOSPITAL ENCOUNTER (INPATIENT)
Dept: HOSPITAL 74 - FER | Age: 83
LOS: 3 days | Discharge: HOME HEALTH | DRG: 282 | End: 2018-12-07
Attending: INTERNAL MEDICINE | Admitting: INTERNAL MEDICINE
Payer: COMMERCIAL

## 2018-12-04 VITALS — BODY MASS INDEX: 29.5 KG/M2

## 2018-12-04 DIAGNOSIS — C50.911: ICD-10-CM

## 2018-12-04 DIAGNOSIS — M19.90: ICD-10-CM

## 2018-12-04 DIAGNOSIS — R13.10: ICD-10-CM

## 2018-12-04 DIAGNOSIS — E78.5: ICD-10-CM

## 2018-12-04 DIAGNOSIS — E03.9: ICD-10-CM

## 2018-12-04 DIAGNOSIS — Z87.891: ICD-10-CM

## 2018-12-04 DIAGNOSIS — Z85.3: ICD-10-CM

## 2018-12-04 DIAGNOSIS — I10: ICD-10-CM

## 2018-12-04 DIAGNOSIS — I21.4: Primary | ICD-10-CM

## 2018-12-04 DIAGNOSIS — R55: ICD-10-CM

## 2018-12-04 LAB
ALBUMIN SERPL-MCNC: 3.9 G/DL (ref 3.5–5)
ALP SERPL-CCNC: 59 U/L (ref 32–92)
ALT SERPL-CCNC: 14 U/L (ref 10–40)
ANION GAP SERPL CALC-SCNC: 6 MMOL/L (ref 8–16)
AST SERPL-CCNC: 23 U/L (ref 10–42)
BACTERIA #/AREA URNS HPF: (no result) /HPF
BASOPHILS # BLD: 0.5 % (ref 0–2)
BILIRUB SERPL-MCNC: 0.7 MG/DL (ref 0.2–1)
BUN SERPL-MCNC: 20 MG/DL (ref 7–18)
CALCIUM SERPL-MCNC: 9 MG/DL (ref 8.4–10.2)
CHLORIDE SERPL-SCNC: 103 MMOL/L (ref 98–107)
CHOLEST SERPL-MCNC: 241 MG/DL
CO2 SERPL-SCNC: 24 MMOL/L (ref 22–28)
CREAT SERPL-MCNC: 0.8 MG/DL (ref 0.6–1.3)
DEPRECATED RDW RBC AUTO: 12.9 % (ref 11.6–15.6)
EOSINOPHIL # BLD: 1.9 % (ref 0–4.5)
EPITH CASTS URNS QL MICRO: (no result) /HPF
GLUCOSE SERPL-MCNC: 99 MG/DL (ref 74–106)
HCT VFR BLD CALC: 43.2 % (ref 32.4–45.2)
HDLC SERPL-MCNC: 47 MG/DL (ref 29–89)
HGB BLD-MCNC: 14.4 GM/DL (ref 10.7–15.3)
INR BLD: 1.03 (ref 0.82–1.09)
LDLC SERPL CALC-MCNC: 173 MG/DL
LYMPHOCYTES # BLD: 16.8 % (ref 8–40)
MCH RBC QN AUTO: 29.9 PG (ref 25.7–33.7)
MCHC RBC AUTO-ENTMCNC: 33.4 G/DL (ref 32–36)
MCV RBC: 89.6 FL (ref 80–96)
MONOCYTES # BLD AUTO: 8.9 % (ref 3.8–10.2)
NEUTROPHILS # BLD: 71.9 % (ref 42.8–82.8)
PH UR: 6.5 [PH] (ref 4.5–8)
PLATELET # BLD AUTO: 271 K/MM3 (ref 134–434)
PMV BLD: 8.4 FL (ref 7.5–11.1)
POTASSIUM SERPLBLD-SCNC: 4.1 MMOL/L (ref 3.5–5.1)
PROT SERPL-MCNC: 7.6 G/DL (ref 6.4–8.3)
PT PNL PPP: 11.5 SEC (ref 10.2–13)
RBC # BLD AUTO: (no result) /HPF (ref 0–3)
RBC # BLD AUTO: 4.82 M/MM3 (ref 3.6–5.2)
SODIUM SERPL-SCNC: 133 MMOL/L (ref 136–145)
SP GR UR: 1.02 (ref 1.01–1.03)
TRIGL SERPL-MCNC: 104 MG/DL (ref 35–160)
UROBILINOGEN UR STRIP-MCNC: 0.2 MG/DL (ref 0.2–1)
WBC # BLD AUTO: 7.9 K/MM3 (ref 4–10.8)
WBC # UR AUTO: (no result) /UL (ref 0–5)

## 2018-12-04 PROCEDURE — A9502 TC99M TETROFOSMIN: HCPCS

## 2018-12-04 RX ADMIN — HEPARIN SODIUM SCH MLS/HR: 5000 INJECTION, SOLUTION INTRAVENOUS at 18:14

## 2018-12-04 NOTE — PDOC
History of Present Illness





<Sandro Rascon - Last Filed: 12/04/18 18:02>





- General


History Source: Patient


Exam Limitations: No Limitations





- History of Present Illness


Initial Comments: 





12/04/18 16:23


The patient is an 89F with a PMH of hypothyroidism, hypertension and right 

breast CA s/p right lumpectomy who presents to the ER with complaints of 

"feeling like she was going to fall". The patient states that she was in her 

normal state of health today and was ambulating to the bathroom without her 

walker. She began to develop a sensation that she was going to fall and slowly 

lowered herself to the ground. She denies head trauma and LOC. She also denies 

lightheadedness, room spinning sensation, CP, SOB, palpitations, nausea, 

vomiting, numbness, tingling, and weakness. 





<Ronald Hernandez - Last Filed: 12/04/18 19:05>





- General


Chief Complaint: Syncope/Near Syncope


Stated Complaint: syncope,fall


Time Seen by Provider: 12/04/18 15:36





Past History





<Sandro Rascon - Last Filed: 12/04/18 18:02>





- Past Medical History


Anemia: Yes


Asthma: No


Cancer: Yes (BREAST, R LUMPECTOMY)


Cardiac Disorders: No


CVA: No


COPD: No


CHF: No


Dementia: No


Diabetes: No


GI Disorders: No


 Disorders: Yes (OBS)


HTN: Yes


Hypercholesterolemia: No


Liver Disease: No


Seizures: No


Thyroid Disease: Yes (hypo)





- Surgical History


Abdominal Surgery: No


Cardiac Surgery: No


Neurologic Surgery: No


Orthopedic Surgery: No





- Immunization History


Immunization Up to Date: Yes





- Suicide/Smoking/Psychosocial Hx


Smoking History: Never smoked


Have you smoked in the past 12 months: No


Hx Alcohol Use: Yes (RARE)


Drug/Substance Use Hx: No


Substance Use Type: None





<Ronald Hernandez - Last Filed: 12/04/18 19:05>





- Past Medical History


Allergies/Adverse Reactions: 


 Allergies











Allergy/AdvReac Type Severity Reaction Status Date / Time


 


cortisone Allergy Intermediate Swelling Verified 12/04/18 15:28


 


Penicillins Allergy   Verified 12/04/18 15:28


 


nickel [Nickel] AdvReac Mild Rash Verified 12/04/18 15:28


 


peniciliin Allergy Intermediate  Uncoded 05/08/17 12:43


 


Peaches  AdvReac Mild redness Uncoded 05/08/17 12:43





   Face  











Home Medications: 


Ambulatory Orders





Acetaminophen [Tylenol Extra Strength] 500 mg PO PRN 05/06/17 


Amlodipine Besylate [Norvasc -] 2.5 mg PO DAILY 05/06/17 


Levothyroxine [Synthroid -] 100 mcg PO DAILY 05/06/17 


Nebivolol HCl [Bystolic] 10 mg DAILY 05/06/17 


Methyl Salicylate/Menthol Oint [Analgesic Balm -] 1 applic TP BID #30 applic 05/

10/17 











**Review of Systems





- Review of Systems


Able to Perform ROS?: Yes


Comments:: 





12/04/18 16:31


GENERAL/CONSTITUTIONAL: Positive for sensation of falling. No fever or chills. 

No weakness.


HEAD, EYES, EARS, NOSE AND THROAT: No change in vision. No ear pain or 

discharge. No sore throat.


CARDIOVASCULAR: No chest pain, palpitations, or lightheadedness.


RESPIRATORY: No cough, wheezing, shortness of breath, or hemoptysis.


GASTROINTESTINAL: No nausea, vomiting, diarrhea, constipation, or abdominal 

pain. 


GENITOURINARY: No dysuria, frequency, hematuria, or change in urination.


MUSCULOSKELETAL: No joint or muscle swelling or pain. No neck or back pain.


SKIN: No rash or lesions. 


NEUROLOGIC: No headache, numbness, tingling, focal weakness, loss of 

consciousness, or change in strength/sensation.


ENDOCRINE: No increased thirst. No abnormal weight change.


HEMATOLOGIC/LYMPHATIC: No anemia, easy bleeding, or history of blood clots.


ALLERGIC/IMMUNOLOGIC: No hives or skin allergy.


Is the patient limited English proficient: No





<Ronald Hernandez - Last Filed: 12/04/18 19:05>





*Physical Exam





- Vital Signs


 Last Vital Signs











Temp Pulse Resp BP Pulse Ox


 


 97.7 F   73   16   156/80   97 


 


 12/04/18 15:27  12/04/18 15:27  12/04/18 15:27  12/04/18 15:27  12/04/18 15:27














<Sandro Rascon - Last Filed: 12/04/18 18:02>





- Vital Signs


 Last Vital Signs











Temp Pulse Resp BP Pulse Ox


 


 97.7 F   73   16   156/80   97 


 


 12/04/18 15:27  12/04/18 15:27  12/04/18 15:27  12/04/18 15:27  12/04/18 15:27














- Physical Exam


Comments: 





12/04/18 16:31


GENERAL: Well developed, well nourished. Awake and alert. No acute distress.


HEENT: Normocephalic, atraumatic. Hearing grossly normal. Moist mucous 

membranes. PERRLA, EOMI. No conjunctival pallor. Sclera are non-icteric. 

Oropharynx is clear.


NECK: Supple. Full ROM. No JVD. 


CARDIOVASCULAR: Regular rate and rhythm. No murmurs, rubs, or gallops. 


PULMONARY: No evidence of respiratory distress. Lungs clear to auscultation 

bilaterally. No wheezing, rales or rhonchi.


ABDOMINAL: Soft. Non-tender. Non-distended. No rebound or guarding. 


GENITOURINARY: No CVA tenderness bilaterally.


MUSCULOSKELETAL: Normal range of motion at all joints. No bony deformities or 

tenderness. 


EXTREMITIES: No cyanosis. No clubbing. No edema. No calf tenderness or swelling.


SKIN: Warm and dry. Normal capillary refill. No rashes. No jaundice. 


NEUROLOGICAL: Alert, awake, appropriate. Cranial nerves 2-12 intact. No 

deficits to light touch and temperature in face, upper extremities and lower 

extremities. 5/5 strength in deltoids, biceps, triceps, quadriceps, hamstrings, 

and gastrocnemius. Finger to nose normal bilaterally. Normal speech. Gait is 

normal without ataxia.


PSYCHIATRIC: Cooperative. Good eye contact. Appropriate mood and affect.








<Ronald Hernandez - Last Filed: 12/04/18 19:05>





Moderate Sedation





- Procedure Monitoring


Vital Signs: 


Procedure Monitoring Vital Signs











Temperature  97.7 F   12/04/18 15:27


 


Pulse Rate  73   12/04/18 15:27


 


Respiratory Rate  16   12/04/18 15:27


 


Blood Pressure  156/80   12/04/18 15:27


 


O2 Sat by Pulse Oximetry (%)  97   12/04/18 15:27











<Sandro Rascon - Last Filed: 12/04/18 18:02>





- Procedure Monitoring


Vital Signs: 


Procedure Monitoring Vital Signs











Temperature  97.7 F   12/04/18 15:27


 


Pulse Rate  73   12/04/18 15:27


 


Respiratory Rate  16   12/04/18 15:27


 


Blood Pressure  156/80   12/04/18 15:27


 


O2 Sat by Pulse Oximetry (%)  97   12/04/18 15:27











<Ronald Hernandez - Last Filed: 12/04/18 19:05>





**Heart Score/ECG Review


  ** #1


General ECG Interpretation: Sinus Rhythm, Normal Rate, Normal Intervals, No 

acute ischemic changes


Compared to previous ECG there are: No significant change





12/04/18 17:16


NSR vent rate 70





QRS 88


QTc 462





No STD or ALEJANDRO


No signs of acute ischemia


Unchanged from prior





<DavidRonald - Last Filed: 12/04/18 19:05>





ED Treatment Course





- LABORATORY


CBC & Chemistry Diagram: 


 12/04/18 15:55





 12/04/18 15:55





- ADDITIONAL ORDERS


Additional order review: 


 Laboratory  Results











  12/04/18 12/04/18 12/04/18





  16:25 16:04 15:55


 


PT with INR  11.5  


 


INR  1.03  


 


Sodium   


 


Potassium   


 


Chloride   


 


Carbon Dioxide   


 


Anion Gap   


 


BUN   


 


Creatinine   


 


Creat Clearance w eGFR   


 


Random Glucose   


 


Calcium   


 


Total Bilirubin   


 


AST   


 


ALT   


 


Alkaline Phosphatase   


 


Creatine Kinase    287 H


 


Creatine Kinase Index    1.6


 


CK-MB (CK-2)    4.6 H


 


Troponin I   


 


Total Protein   


 


Albumin   


 


Urine Color   Yellow 


 


Urine Appearance   Clear 


 


Urine pH   6.5 


 


Ur Specific Gravity   1.020 


 


Urine Protein   Negative 


 


Urine Glucose (UA)   Negative 


 


Urine Ketones   Negative 


 


Urine Blood   Trace-lysed H 


 


Urine Nitrite   Negative 


 


Urine Bilirubin   Negative 


 


Urine Urobilinogen   0.2 


 


Ur Leukocyte Esterase   Negative 


 


Urine RBC   2-5 


 


Urine WBC   0-2 


 


Ur Epithelial Cells   1+ 


 


Urine Bacteria   1+ 














  12/04/18 12/04/18





  15:55 15:55


 


PT with INR  


 


INR  


 


Sodium   133 L


 


Potassium   4.1  D


 


Chloride   103


 


Carbon Dioxide   24


 


Anion Gap   6 L


 


BUN   20 H


 


Creatinine   0.8


 


Creat Clearance w eGFR   > 60


 


Random Glucose   99


 


Calcium   9.0


 


Total Bilirubin   0.7


 


AST   23  D


 


ALT   14


 


Alkaline Phosphatase   59


 


Creatine Kinase   Cancelled


 


Creatine Kinase Index  


 


CK-MB (CK-2)  


 


Troponin I  1.13 H*  Cancelled


 


Total Protein   7.6


 


Albumin   3.9


 


Urine Color  


 


Urine Appearance  


 


Urine pH  


 


Ur Specific Gravity  


 


Urine Protein  


 


Urine Glucose (UA)  


 


Urine Ketones  


 


Urine Blood  


 


Urine Nitrite  


 


Urine Bilirubin  


 


Urine Urobilinogen  


 


Ur Leukocyte Esterase  


 


Urine RBC  


 


Urine WBC  


 


Ur Epithelial Cells  


 


Urine Bacteria  








 











  12/04/18





  15:55


 


RBC  4.82


 


MCV  89.6


 


MCHC  33.4


 


RDW  12.9


 


MPV  8.4


 


Neutrophils %  71.9


 


Lymphocytes %  16.8


 


Monocytes %  8.9


 


Eosinophils %  1.9


 


Basophils %  0.5














- Medications


Given in the ED: 


ED Medications














Discontinued Medications














Generic Name Dose Route Start Last Admin





  Trade Name David  PRN Reason Stop Dose Admin


 


Aspirin  325 mg  12/04/18 17:12  12/04/18 17:29





  Ecotrin -  PO  12/04/18 17:13  325 mg





  ONCE ONE   Administration





     





     





     





     














<Sandro Rascon - Last Filed: 12/04/18 18:02>





- LABORATORY


CBC & Chemistry Diagram: 


 12/04/18 15:55





 12/04/18 15:55





- ADDITIONAL ORDERS


Additional order review: 


 Laboratory  Results











  12/04/18 12/04/18





  16:04 15:55


 


Creatine Kinase   Cancelled


 


Troponin I   Cancelled


 


Urine Color  Yellow 


 


Urine Appearance  Clear 


 


Urine pH  6.5 


 


Ur Specific Gravity  1.020 


 


Urine Protein  Negative 


 


Urine Glucose (UA)  Negative 


 


Urine Ketones  Negative 


 


Urine Blood  Trace-lysed H 


 


Urine Nitrite  Negative 


 


Urine Bilirubin  Negative 


 


Urine Urobilinogen  0.2 


 


Ur Leukocyte Esterase  Negative 














- RADIOLOGY


Radiology Studies Ordered: 














 Category Date Time Status


 


 HEAD CT WITHOUT CONTRAST [CT] Stat CT Scan  12/04/18 15:36 Taken


 


 CHEST X-RAY PORTABLE* [RAD] Stat Radiology  12/04/18 15:36 Ordered














<Ronald Hernandez - Last Filed: 12/04/18 19:05>





Medical Decision Making





- Medical Decision Making





12/04/18 17:29


The patient is an 89F with a PMH of HTN and hypothyroidism who presents to the 

ER with complaints of falling. The patient's story does not correlate and she 

continues to express confusion as she tells it. Labs and imaging ordered. CTH 

negative. Trop of <1.0. Will give asa and d/w cardiology.





12/04/18 17:35


Dr. Carias paged [phone].





12/04/18 17:40


Case d/w Dr. Carias who wants to add a lipid profile and TSH. He agrees to 

start heparin. Orders placed. Hospitalist microblogged for admission.





Attending endorsed pt to hospitalist team. Will transfer to main hospital for 

telemetry admission.





<Ronald Hernandez - Last Filed: 12/04/18 19:05>





*DC/Admit/Observation/Transfer





- Discharge Dispostion


Decision to Admit order: Yes





<Sandro Rascon - Last Filed: 12/04/18 18:02>





- Discharge Dispostion


Decision to Admit order Date/Time: 


Decision to Admit Order











 Category Date Time Status


 


 Decision to Admit to Hospital Routine Admission  12/04/18 15:52 Active














<Ronald Hernandez - Last Filed: 12/04/18 19:05>


Diagnosis at time of Disposition: 


 NSTEMI (non-ST elevated myocardial infarction)





Syncope


Qualifiers:


 Syncope type: unspecified Qualified Code(s): R55 - Syncope and collapse








- Discharge Dispostion


Condition at time of disposition: Guarded





- Referrals


Referrals: 


Jan Laird MD [Primary Care Provider] -

## 2018-12-04 NOTE — CON.CARD
Consult





- History of Present Illness


History of Present Illness: 





The patient is an 89F with a PMH of hypothyroidism, hypertension and right 

breast CA s/p right lumpectomy who presents to the ER with complaints of 

"feeling like she was going to fall". The patient states that she was in her 

normal state of health today and was ambulating to the bathroom without her 

walker. She began to develop a sensation that she was going to fall and slowly 

lowered herself to the ground. She denies head trauma and LOC. She also denies 

lightheadedness, room spinning sensation, CP, SOB, palpitations, nausea, 

vomiting, numbness, tingling, and weakness. 








- Past Medical History


Cardio/Vascular: Yes: HTN, Hyperlipdemia


Gastrointestinal: Yes: Other (Dysphagia, being worked up by GI.)


Musculoskeletal: Yes: Osteoarthritis


Endocrine: Yes: Hypothyroidism





- Past Surgical History


Past Surgical History: Yes: Breast Biopsy (History of right breast carcinome in 

1996 treated with lumpectomy, radiotherapy and Tamoxiphen)





- Alcohol/Substance Use


Hx Alcohol Use: Yes (RARE)





- Smoking History


Smoking history: Never smoked


Have you smoked in the past 12 months: No





- Social History


ADL: Independent


History of Recent Travel: No





Home Medications





- Allergies


Allergies/Adverse Reactions: 


 Allergies











Allergy/AdvReac Type Severity Reaction Status Date / Time


 


cortisone Allergy Intermediate Swelling Verified 12/04/18 15:28


 


Penicillins Allergy   Verified 12/04/18 15:28


 


nickel [Nickel] AdvReac Mild Rash Verified 12/04/18 15:28


 


peniciliin Allergy Intermediate  Uncoded 05/08/17 12:43


 


Peaches  AdvReac Mild redness Uncoded 05/08/17 12:43





   Face  














- Home Medications


Home Medications: 


Ambulatory Orders





Acetaminophen [Tylenol Extra Strength] 500 mg PO PRN 05/06/17 


Amlodipine Besylate [Norvasc -] 2.5 mg PO DAILY 05/06/17 


Levothyroxine [Synthroid -] 100 mcg PO DAILY 05/06/17 


Nebivolol HCl [Bystolic] 10 mg DAILY 05/06/17 


Methyl Salicylate/Menthol Oint [Analgesic Balm -] 1 applic TP BID #30 applic 05/

10/17 











Family Disease History





- Family Disease History


Family Disease History: Diabetes: Brother, CA: Sister (Colon cancer)


Vital Signs: 


 Vital Signs











Temperature  97.9 F   12/04/18 19:49


 


Pulse Rate  71   12/04/18 19:49


 


Respiratory Rate  18   12/04/18 19:49


 


Blood Pressure  159/71   12/04/18 19:49


 


O2 Sat by Pulse Oximetry (%)  99   12/04/18 19:49














- Other Data


Labs, Other Data: 


 CBC, BMP





 12/04/18 15:55 





 12/04/18 15:55 





 INR, PTT











INR  1.03  (0.82-1.09)   12/04/18  16:25    








 Troponin, BNP











  12/04/18 12/04/18





  15:55 15:55


 


Troponin I  Cancelled  1.13 H*








 Troponin, BNP











  12/04/18 12/04/18





  15:55 15:55


 


Troponin I  Cancelled  1.13 H*

## 2018-12-04 NOTE — PDOC
Attending Attestation





- Resident


Resident Name: Ronald Hernandez





- ED Attending Attestation


I have performed the following: I have examined & evaluated the patient, The 

case was reviewed & discussed with the resident, I agree w/resident's findings 

& plan, Exceptions are as noted





- HPI


HPI: 





12/04/18 16:43


89y F hx of hypothyroidism, hypertension presents with possible syncope.  The 

patients states she woke up feeling fine this morning, sometime after 1pm (when 

her doughter left) she started feeling genearlized weakenss. she somehow ended 

up on the floor - does not recall specifically if she laid down.  denies any 

associated cp, palpitations, ligheahdedess, cough, sob, hemoptysis,, increased 

leg swelling,a bd pain, back pain.  She called EMS herself who sent her to the 

ED.  pt thinks she was down approx 1 hr but is uncertain. currently she is 

endorsing a mild headche, also endorses slight R arm discomfort. 





USOH this mraster, ambulating to the restroom, felt like she was going to fall 

so she sat down slowly


denies any lightheadedness, vertigo, cp, sob, palpitations, n/v, nmnbess/

tingling/weakness. 


f/c, cough, diarrhea, melena/bpr, 


pt notse mild headache





PMD


Fader








- Physicial Exam


PE: 





12/04/18 18:00


GENERAL: The patient is awake, alert, and fully oriented, Nontoxic - in no 

acute distress.


HEAD: Normocephalic, atraumatic.


EYES: extraocular movements intact, sclera anicteric, conjunctiva clear.


ENT: Normal voice,  Moist mucous membranes.


NECK: Normal range of motion, supple


LUNGS: Breath sounds equal, clear to auscultation bilaterally.  No wheezes, no 

rhonchi, no rales.


HEART: Regular rate and rhythm, normal S1 and S2 without murmur, rub or gallop.


ABDOMEN: Soft, nontender  No guarding, no rebound.  . No CVA tenderness


EXTREMITIES: Normal range of motion of UE, LE b/l LE edema, no calf tendernses, 

neg homns


BACK: No focal mildine tenderness to cervical/thoracic/lumbar spine


NEUROLOGICAL: No facial assymetry, Normal speech, 


PSYCH: Normal mood, normal affect.


SKIN: Warm, Dry, normal turgor,





- Critical Care Time


Total Critical Care Time: 35


Critical Care Statement: The care of this patient involved high complexity 

decision making to prevent further life threatening deterioration of the patient

's condition and/or to evaluate & treat vital organ system(s) failure or risk 

of failure.





- Medical Decision Making





12/04/18 18:00


possible syncope - no other symtmos


ddx - anemia,metabolic denargmeent, atypical acs


will obtain labs, ct head, ekg





labs noted for trop of 1.13 - no st changes on ekg


NSTEMI - will admit for furrhter mangaement


will start heparin 





case dw Dr. Ledezma - agree with telemetry


will transfer the pt to [facility] in light of positive troponin





Case discussed in detail with admitting physician including history, physical 

exam and ancillary studies.


Admitting physician has assumed care for the patient, will follow all pending 

diagnostics and will complete the evaluation and treatment. 








**Heart Score/ECG Review





- ECG Impressions


Comment:: 





12/04/18 18:01


Twelve-lead EKG was performed and reviewed by me. 


There is normal sinus rhythm with a normal rate. 


The axis is normal. 


The intervals are normal. 


There is normal R wave progression


There are no ST or T wave abnormalities.





Impression: Normal twelve-lead EKG

## 2018-12-05 LAB
ANION GAP SERPL CALC-SCNC: 7 MMOL/L (ref 8–16)
BUN SERPL-MCNC: 15 MG/DL (ref 7–18)
CALCIUM SERPL-MCNC: 8.5 MG/DL (ref 8.5–10.1)
CHLORIDE SERPL-SCNC: 106 MMOL/L (ref 98–107)
CHOLEST SERPL-MCNC: 210 MG/DL (ref 50–200)
CO2 SERPL-SCNC: 27 MMOL/L (ref 21–32)
CREAT SERPL-MCNC: 0.8 MG/DL (ref 0.55–1.3)
DEPRECATED RDW RBC AUTO: 13.6 % (ref 11.6–15.6)
GLUCOSE SERPL-MCNC: 96 MG/DL (ref 74–106)
HCT VFR BLD CALC: 37.2 % (ref 32.4–45.2)
HDLC SERPL-MCNC: 45 MG/DL (ref 40–60)
HGB BLD-MCNC: 13.2 GM/DL (ref 10.7–15.3)
MAGNESIUM SERPL-MCNC: 2.2 MG/DL (ref 1.8–2.4)
MCH RBC QN AUTO: 30.7 PG (ref 25.7–33.7)
MCHC RBC AUTO-ENTMCNC: 35.4 G/DL (ref 32–36)
MCV RBC: 86.6 FL (ref 80–96)
PHOSPHATE SERPL-MCNC: 3.4 MG/DL (ref 2.5–4.9)
PLATELET # BLD AUTO: 247 K/MM3 (ref 134–434)
PMV BLD: 7.8 FL (ref 7.5–11.1)
POTASSIUM SERPLBLD-SCNC: 4 MMOL/L (ref 3.5–5.1)
RBC # BLD AUTO: 4.29 M/MM3 (ref 3.6–5.2)
SODIUM SERPL-SCNC: 140 MMOL/L (ref 136–145)
TRIGL SERPL-MCNC: 118 MG/DL (ref 0–150)
WBC # BLD AUTO: 8.3 K/MM3 (ref 4–10)

## 2018-12-05 RX ADMIN — ACETAMINOPHEN PRN MG: 325 TABLET ORAL at 14:37

## 2018-12-05 RX ADMIN — HEPARIN SODIUM SCH MLS/HR: 5000 INJECTION, SOLUTION INTRAVENOUS at 18:17

## 2018-12-05 NOTE — ECHO
______________________________________________________________________________



Name: NYDIA AUTUMN                                   Exam:Adult Echocardiogram

MRN: [id number]         Study Date: 2018 09:07 AM

Age: 89 yrs

______________________________________________________________________________



Reason For Study: ELEVATED TROPONIN

Height: 61 in        Weight: 156 lb        BSA: 1.7 m2



______________________________________________________________________________



MMode/2D Measurements & Calculations

IVSd: 0.82 cm                                         Ao root diam: 2.6 cm

LVIDd: 4.2 cm                                         LA dimension: 3.4 cm

LVIDs: 3.0 cm

LVPWd: 0.77 cm



_______________________________________________________

EDV(Teich): 79.7 ml

ESV(Teich): 33.9 ml



Doppler Measurements & Calculations

MV E max thomas: 57.3 cm/sec                              AI P1/2t: 386.9 msec

MV A max thomas: 93.8 cm/sec

MV E/A: 0.61



________________________________________________________

AI max thomas: 222.9 cm/sec                               TR max thomas: 220.8 cm/sec

AI max P.0 mmHg                                   TR max P.5 mmHg



AI dec slope: 168.8 cm/sec2

________________________________________________________

PI end-d thomas: 70.8 cm/sec                              Med Peak E' Thomas: 4.8 cm/sec

                                                       Med E/e': 12.0

                                                       Lat Peak E' Thomas: 6.3 cm/sec

                                                       Lat E/e': 9.0





______________________________________________________________________________



Procedure

A two-dimensional transthoracic echocardiogram with color flow and Doppler was performed.

Left Ventricle

The left ventricular size, thickness and function are normal. The left ventricular ejection fraction 
is

normal. E/A reversal consistent with but not diagnostic of poor LV compliance. The left ventricular w
all

motion is normal.

Right Ventricle

The right ventricle is not well visualized.

Atria

Normal left and right atrial size and function.

Mitral Valve

There is mild mitral valve thickening. There is no mitral valve stenosis. There is trace mitral regur
gitation.

Tricuspid Valve

The tricuspid valve is normal. There is no tricuspid stenosis. There is mild tricuspid regurgitation.
 Right

ventricular systolic pressure is normal.

Aortic Valve

The aortic valve is not well visualized. No hemodynamically significant valvular aortic stenosis. Mil
d to

moderate aortic regurgitation.

Pulmonic Valve

The pulmonic valve is not well visualized. There is no pulmonic valvular stenosis. Trace to mild pulm
onic

valvular regurgitation.

Great Vessels

The aortic root is normal size.

Pericardium/Pleura

There is no pericardial effusion.

______________________________________________________________________________





Interpretation Summary

The left ventricular size, thickness and function are normal

The left ventricular ejection fraction is normal.

The left ventricular wall motion is normal.

There is mild tricuspid regurgitation.

Right ventricular systolic pressure is normal.

Mild to moderate aortic regurgitation.

E/A reversal consistent with but not diagnostic of poor LV compliance

There is trace mitral regurgitation.





MD Reuben Benitez 2018 12:16 PM

## 2018-12-05 NOTE — CON.CARD
Consult


Consult Specialty:: cardiology


Reason for Consultation:: possible syncope





- History of Present Illness


History of Present Illness: 





89y F hx of hypothyroidism, hypertension presents with possible syncope while 

walking.  The patients states she woke up feeling fine this morning, sometime 

after 1pm (when her doughter left) she started feeling genearlized weakenss. 

she somehow ended up on the floor - does not recall specifically if she laid 

down.  denies any associated cp, palpitations, ligheahdedess, cough, sob, 

hemoptysis,, increased leg swelling,a bd pain, back pain.  She called EMS 

herself who sent her to the ED.  pt thinks she was down approx 1 hr but is 

uncertain. currently she is endorsing a mild headche, also endorses slight R 

arm discomfort. 





USOH this mraster, ambulating to the restroom, felt like she was going to fall 

so she sat down slowly


denies any lightheadedness, vertigo, cp, sob, palpitations, n/v, nmnbess/

tingling/weakness. 


f/c, cough, diarrhea, melena/bpr, 


pt notse mild headache





PMD  











- History Source


History Provided By: Medical Record





- Past Medical History


Cardio/Vascular: Yes: HTN, Hyperlipdemia


Gastrointestinal: Yes: Other (Dysphagia, being worked up by GI.)


Musculoskeletal: Yes: Osteoarthritis


Endocrine: Yes: Hypothyroidism





- Past Surgical History


Past Surgical History: Yes: Breast Biopsy (History of right breast carcinome in 

1996 treated with lumpectomy, radiotherapy and Tamoxiphen)





- Alcohol/Substance Use


Hx Alcohol Use: Yes (RARE)





- Smoking History


Smoking history: Never smoked


Have you smoked in the past 12 months: No





- Social History


ADL: Independent


History of Recent Travel: No





Home Medications





- Allergies


Allergies/Adverse Reactions: 


 Allergies











Allergy/AdvReac Type Severity Reaction Status Date / Time


 


cortisone Allergy Intermediate Swelling Verified 12/04/18 15:28


 


Penicillins Allergy   Verified 12/04/18 15:28


 


nickel [Nickel] AdvReac Mild Rash Verified 12/04/18 15:28


 


peniciliin Allergy Intermediate  Uncoded 05/08/17 12:43


 


Peaches  AdvReac Mild redness Uncoded 05/08/17 12:43





   Face  














- Home Medications


Home Medications: 


Ambulatory Orders





Acetaminophen [Tylenol Extra Strength] 500 mg PO PRN 05/06/17 


Amlodipine Besylate [Norvasc -] 2.5 mg PO DAILY 05/06/17 


Levothyroxine [Synthroid -] 100 mcg PO DAILY 05/06/17 


Nebivolol HCl [Bystolic] 10 mg DAILY 05/06/17 


Methyl Salicylate/Menthol Oint [Analgesic [place] -] 1 applic TP BID #30 applic 05/

10/17 











Family Disease History





- Family Disease History


Family Disease History: Diabetes: Brother, CA: Sister (Colon cancer)





Review of Systems





- Review of Systems


Constitutional: reports: Weakness


Eyes: reports: No Symptoms


HENT: reports: No Symptoms


Neck: reports: No Symptoms


Cardiovascular: reports: No Symptoms


Respiratory: reports: No Symptoms


Gastrointestinal: reports: No Symptoms


Genitourinary: reports: No Symptoms


Breasts: reports: No Symptoms Reported


Musculoskeletal: reports: Muscle Weakness


Integumentary: reports: No Symptoms


Neurological: reports: No Symptoms


Endocrine: reports: No Symptoms


Hematology/Lymphatic: reports: No Symptoms


Psychiatric: reports: No Symptoms


Vital Signs: 


 Vital Signs











Temperature  98.3 F   12/05/18 08:59


 


Pulse Rate  66   12/05/18 08:59


 


Respiratory Rate  16   12/05/18 09:00


 


Blood Pressure  111/82   12/05/18 08:59


 


O2 Sat by Pulse Oximetry (%)  98   12/05/18 09:00














- Other Data


Labs, Other Data: 


 CBC, BMP





 12/05/18 04:30 





 12/05/18 04:30 





 INR, PTT











INR  1.03  (0.82-1.09)   12/04/18  16:25    








 Troponin, BNP











  12/04/18 12/04/18 12/05/18





  15:55 15:55 04:30


 


Troponin I  Cancelled  1.13 H*  0.09 H








 Troponin, BNP











  12/04/18 12/04/18 12/05/18





  15:55 15:55 04:30


 


Troponin I  Cancelled  1.13 H*  0.09 H














Imaging





- Results


Chest X-ray: Image Reviewed (Right axillary clips; right hemidiaphragm elevation

; no acute patholoby)





Problem List





- Problems


(1) NSTEMI (non-ST elevated myocardial infarction)


Assessment/Plan: 


1st TNI 1.1


F/u TNI serially.


EKG: normal study.


F/u ECHO for LVEF, wall motion.


IV heparin


ASA


Statin.


Code(s): I21.4 - NON-ST ELEVATION (NSTEMI) MYOCARDIAL INFARCTION   





(2) Syncope


Assessment/Plan: 


f/u orthostatic vital signs


Maintatin hydration


Carotid artery US


ECHO for LVEF, wall motion, r/o thrombus; valve status.


TSH


Code(s): R55 - SYNCOPE AND COLLAPSE   


Qualifiers: 


   Syncope type: unspecified   Qualified Code(s): R55 - Syncope and collapse   





(3) Advanced directives, counseling/discussion


Code(s): Z71.89 - OTHER SPECIFIED COUNSELING   





(4) Hypertension


Code(s): I10 - ESSENTIAL (PRIMARY) HYPERTENSION   





(5) Hypothyroidism


Code(s): E03.9 - HYPOTHYROIDISM, UNSPECIFIED   





(6) Osteoarthritis


Code(s): M19.90 - UNSPECIFIED OSTEOARTHRITIS, UNSPECIFIED SITE

## 2018-12-05 NOTE — PN
Teaching Attending Note


Name of Resident: Kel Bedoya





ATTENDING PHYSICIAN STATEMENT





I saw and evaluated the patient.


I reviewed the resident's note and discussed the case with the resident.


I agree with the resident's findings and plan as documented.








SUBJECTIVE:


Patient is an 89 year old woman with a PMH of hypothyroidism, ?osteoarthritis, 

hypertension and right breast CA s/p right lumpectomy who presents to [facility] with complaints of "feeling like she was going to fall". The patient 

states that she was in her normal state of health today and was ambulating to 

the bathroom without her walker. She began to develop a sensation that she was 

going to fall and slowly lowered herself to the ground. Has chronic knee pain. 

Recent podiatry visit for foot issues. She denies head trauma and LOC. She also 

denies lightheadedness, room spinning sensation, CP, SOB, palpitations, nausea, 

vomiting, numbness, tingling, and weakness. Noted to have elevated troponin. 

Cardiology was consulted. Transferred to [facility] for telemetry monitoring and 

cardiologic care. 





OBJECTIVE:


Alert


 Vital Signs











 Period  Temp  Pulse  Resp  BP Sys/Hui  Pulse Ox


 


 Last 24 Hr  97.2 F-97.9 F  66-73  16-20  138-176/65-84  96-99








HEENT: No Jaundice, eye redness or discharge, PERRLA, EOMI. Normocephalic, 

atraumatic. External ears are normal and hearing is grossly intact. No nasal 

discharge.


Neck: Supple, nontender. No palpable adenopathy or thyromegaly. No JVD


Chest: Good effort. Clear to auscultation and percussion.


Heart: Regular. No S3, rub or murmur


Abdomen: Not distended, soft, nontender and no HSM. No rebound or guarding.  

Normoactive bowel sounds.


Ext: Peripheral pulses intact. No leg edema. Donut pad on ball of right foot.


Skin: Warm and very dry scaly skin. No petechiae, rash or ecchymosis.


Neuro: Alert. Oriented x3. CN 2-12 grossly intact. Sensation grossly intact in 

all four extremities and DTR are symmetric.


 Current Medications











Generic Name Dose Route Start Last Admin





  Trade Name Freq  PRN Reason Stop Dose Admin


 


Acetaminophen  650 mg  12/05/18 04:46  





  Tylenol -  PO   





  Q6H PRN   





  PAIN   





     





     





     


 


Heparin Sodium (Porcine)  5,000 unit  12/04/18 17:56  





  Heparin -  IVPUSH   





  PRN PRN   





     





     





     





     


 


Heparin Sodium/Dextrose  25,000 units in 500 mls @ 20 mls/hr  12/04/18 18:00  12 /04/18 18:14





  Heparin Infusion -  IVPB   1,000 units/hr





  TITR JACOB   20 mls/hr





     Administration





     





  Protocol   





  1,000 UNITS/HR   








 Home Medications











 Medication  Instructions  Recorded


 


Acetaminophen [Tylenol Extra 500 mg PO PRN 05/06/17





Strength]  


 


Amlodipine Besylate [Norvasc -] 2.5 mg PO DAILY 05/06/17


 


Levothyroxine [Synthroid -] 100 mcg PO DAILY 05/06/17


 


Nebivolol HCl [Bystolic] 10 mg DAILY 05/06/17


 


Methyl Salicylate/Menthol Oint 1 applic TP BID #30 applic 05/10/17





[Analgesic [place] -]  








 Abnormal Lab Results











  12/04/18 12/04/18 12/04/18





  15:55 15:55 15:55


 


Sodium  133 L  


 


Anion Gap  6 L  


 


BUN  20 H  


 


Creatine Kinase    287 H


 


CK-MB (CK-2)    4.6 H


 


Troponin I   1.13 H* 


 


Urine Blood   














  12/04/18





  16:04


 


Sodium 


 


Anion Gap 


 


BUN 


 


Creatine Kinase 


 


CK-MB (CK-2) 


 


Troponin I 


 


Urine Blood  Trace-lysed H











ASSESSMENT AND PLAN:


1. Near syncope/NSTEMI - Head CT did not show any acute pathology. EKG is NSR 

and did not show any ST-T wave changes. Will get repeat troponin, continue IV 

heparin drip and beta blocker, telemetry monitoring, get ECHO and fasting 

lipids. Treat with statin.





2. DVT prophylaxis - On IV Heparin drip





3. Advance directives - Full code

## 2018-12-05 NOTE — PN
Teaching Attending Note


Name of Resident: Hillary Garland





ATTENDING PHYSICIAN STATEMENT





I saw and evaluated the patient.


I reviewed the resident's note and discussed the case with the resident.


I agree with the resident's findings and plan as documented with exceptions 

below.








SUBJECTIVE:


Patient seen and examined. Feels better. Reports felt weak yesterday and fell 

on the buttocks, crawled and called EMS. Denies LOC or ever having any chest 

pain, dizziness, palpitations or dyspnea. 





OBJECTIVE:


 Vital Signs











 Period  Temp  Pulse  Resp  BP Sys/Hui  Pulse Ox


 


 Last 24 Hr  97.1 F-98.4 F  66-71  16-20  111-176/65-84  95-99








 Intake & Output











 12/02/18 12/03/18 12/04/18 12/05/18





 23:59 23:59 23:59 23:59


 


Intake Total    516


 


Balance    516


 


Weight   165 lb 156 lb








General: sitting in bed in no acute distress


Chest: good effort, no rales or wheezing


Neck: soft, supple, no JVD


Abdomen:Soft, obese, NT


Extremities: no edema








 Home Medications











 Medication  Instructions  Recorded


 


Acetaminophen [Tylenol Extra 500 mg PO PRN 05/06/17





Strength]  


 


Amlodipine Besylate [Norvasc -] 2.5 mg PO DAILY 05/06/17


 


Levothyroxine [Synthroid -] 100 mcg PO DAILY 05/06/17


 


Nebivolol HCl [Bystolic] 10 mg DAILY 05/06/17


 


Methyl Salicylate/Menthol Oint 1 applic TP BID #30 applic 05/10/17





[Analgesic [place] -]  








Active Medications





Acetaminophen (Tylenol -)  650 mg PO Q6H PRN


   PRN Reason: PAIN LEVEL 1-5


   Last Admin: 12/05/18 14:37 Dose:  650 mg


Heparin Sodium (Porcine) (Heparin -)  5,000 unit IVPUSH PRN PRN


Heparin Sodium/Dextrose (Heparin Infusion -)  25,000 units in 500 mls @ 20 mls/

hr IVPB TITR JACOB; Protocol


   Last Titration: 12/05/18 08:50 Dose:  900 units/hr, 18 mls/hr





 Laboratory Results - last 24 hr











  12/04/18 12/04/18 12/04/18





  15:55 15:55 15:55


 


WBC  7.9  


 


RBC  4.82  


 


Hgb  14.4  


 


Hct  43.2  


 


MCV  89.6  


 


MCH  29.9  


 


MCHC  33.4  


 


RDW  12.9  


 


Plt Count  271  


 


MPV  8.4  


 


Absolute Neuts (auto)  5.8  


 


Neutrophils %  71.9  


 


Lymphocytes %  16.8  


 


Monocytes %  8.9  


 


Eosinophils %  1.9  


 


Basophils %  0.5  


 


PT with INR   


 


INR   


 


PTT (Actin FS)   


 


Sodium   133 L 


 


Potassium   4.1  D 


 


Chloride   103 


 


Carbon Dioxide   24 


 


Anion Gap   6 L 


 


BUN   20 H 


 


Creatinine   0.8 


 


Creat Clearance w eGFR   > 60 


 


Random Glucose   99 


 


Calcium   9.0 


 


Phosphorus   


 


Magnesium   


 


Total Bilirubin   0.7 


 


AST   23  D 


 


ALT   14 


 


Alkaline Phosphatase   59 


 


Creatine Kinase   Cancelled 


 


Creatine Kinase Index   


 


CK-MB (CK-2)   


 


Troponin I   Cancelled  1.13 H*


 


Total Protein   7.6 


 


Albumin   3.9 


 


Triglycerides   


 


Cholesterol   


 


Total LDL Cholesterol   


 


HDL Cholesterol   


 


TSH   


 


Urine Color   


 


Urine Appearance   


 


Urine pH   


 


Ur Specific Gravity   


 


Urine Protein   


 


Urine Glucose (UA)   


 


Urine Ketones   


 


Urine Blood   


 


Urine Nitrite   


 


Urine Bilirubin   


 


Urine Urobilinogen   


 


Ur Leukocyte Esterase   


 


Urine RBC   


 


Urine WBC   


 


Ur Epithelial Cells   


 


Urine Bacteria   














  12/04/18 12/04/18 12/04/18





  15:55 16:04 16:25


 


WBC   


 


RBC   


 


Hgb   


 


Hct   


 


MCV   


 


MCH   


 


MCHC   


 


RDW   


 


Plt Count   


 


MPV   


 


Absolute Neuts (auto)   


 


Neutrophils %   


 


Lymphocytes %   


 


Monocytes %   


 


Eosinophils %   


 


Basophils %   


 


PT with INR    11.5


 


INR    1.03


 


PTT (Actin FS)   


 


Sodium   


 


Potassium   


 


Chloride   


 


Carbon Dioxide   


 


Anion Gap   


 


BUN   


 


Creatinine   


 


Creat Clearance w eGFR   


 


Random Glucose   


 


Calcium   


 


Phosphorus   


 


Magnesium   


 


Total Bilirubin   


 


AST   


 


ALT   


 


Alkaline Phosphatase   


 


Creatine Kinase  287 H  


 


Creatine Kinase Index  1.6  


 


CK-MB (CK-2)  4.6 H  


 


Troponin I   


 


Total Protein   


 


Albumin   


 


Triglycerides   


 


Cholesterol   


 


Total LDL Cholesterol   


 


HDL Cholesterol   


 


TSH   


 


Urine Color   Yellow 


 


Urine Appearance   Clear 


 


Urine pH   6.5 


 


Ur Specific Gravity   1.020 


 


Urine Protein   Negative 


 


Urine Glucose (UA)   Negative 


 


Urine Ketones   Negative 


 


Urine Blood   Trace-lysed H 


 


Urine Nitrite   Negative 


 


Urine Bilirubin   Negative 


 


Urine Urobilinogen   0.2 


 


Ur Leukocyte Esterase   Negative 


 


Urine RBC   2-5 


 


Urine WBC   0-2 


 


Ur Epithelial Cells   1+ 


 


Urine Bacteria   1+ 














  12/04/18 12/04/18 12/05/18





  16:30 16:45 04:30


 


WBC   


 


RBC   


 


Hgb   


 


Hct   


 


MCV   


 


MCH   


 


MCHC   


 


RDW   


 


Plt Count   


 


MPV   


 


Absolute Neuts (auto)   


 


Neutrophils %   


 


Lymphocytes %   


 


Monocytes %   


 


Eosinophils %   


 


Basophils %   


 


PT with INR   


 


INR   


 


PTT (Actin FS)  26.6  


 


Sodium    140


 


Potassium    4.0


 


Chloride    106


 


Carbon Dioxide    27


 


Anion Gap    7 L


 


BUN    15


 


Creatinine    0.8


 


Creat Clearance w eGFR    > 60


 


Random Glucose    96


 


Calcium    8.5


 


Phosphorus    3.4


 


Magnesium    2.2


 


Total Bilirubin   


 


AST   


 


ALT   


 


Alkaline Phosphatase   


 


Creatine Kinase   


 


Creatine Kinase Index   


 


CK-MB (CK-2)   


 


Troponin I    0.09 H


 


Total Protein   


 


Albumin   


 


Triglycerides   104  118


 


Cholesterol   241  210 H


 


Total LDL Cholesterol   173  143 H


 


HDL Cholesterol   47  45


 


TSH   0.87 


 


Urine Color   


 


Urine Appearance   


 


Urine pH   


 


Ur Specific Gravity   


 


Urine Protein   


 


Urine Glucose (UA)   


 


Urine Ketones   


 


Urine Blood   


 


Urine Nitrite   


 


Urine Bilirubin   


 


Urine Urobilinogen   


 


Ur Leukocyte Esterase   


 


Urine RBC   


 


Urine WBC   


 


Ur Epithelial Cells   


 


Urine Bacteria   














  12/05/18 12/05/18 12/05/18





  04:30 04:30 11:00


 


WBC  8.3  


 


RBC  4.29  


 


Hgb  13.2  


 


Hct  37.2  


 


MCV  86.6  


 


MCH  30.7  


 


MCHC  35.4  


 


RDW  13.6  


 


Plt Count  247  


 


MPV  7.8  


 


Absolute Neuts (auto)   


 


Neutrophils %   


 


Lymphocytes %   


 


Monocytes %   


 


Eosinophils %   


 


Basophils %   


 


PT with INR   


 


INR   


 


PTT (Actin FS)   100.5 H 


 


Sodium   


 


Potassium   


 


Chloride   


 


Carbon Dioxide   


 


Anion Gap   


 


BUN   


 


Creatinine   


 


Creat Clearance w eGFR   


 


Random Glucose   


 


Calcium   


 


Phosphorus   


 


Magnesium   


 


Total Bilirubin   


 


AST   


 


ALT   


 


Alkaline Phosphatase   


 


Creatine Kinase    356 H


 


Creatine Kinase Index    0.7


 


CK-MB (CK-2)    2.7


 


Troponin I    0.05


 


Total Protein   


 


Albumin   


 


Triglycerides   


 


Cholesterol   


 


Total LDL Cholesterol   


 


HDL Cholesterol   


 


TSH   


 


Urine Color   


 


Urine Appearance   


 


Urine pH   


 


Ur Specific Gravity   


 


Urine Protein   


 


Urine Glucose (UA)   


 


Urine Ketones   


 


Urine Blood   


 


Urine Nitrite   


 


Urine Bilirubin   


 


Urine Urobilinogen   


 


Ur Leukocyte Esterase   


 


Urine RBC   


 


Urine WBC   


 


Ur Epithelial Cells   


 


Urine Bacteria   








2d Echo results reviewed





ASSESSMENT AND PLAN:


89 yof with PMHx of HTN, Hypothyroidism, R Breast CA (S/P lumpectomy)  admitted 

with weakness, mechanical fall and found with elevated troponin





-Weakness


-Mechanical fall (states sat on floor as felt weak, crawled and called EMS, 

denies LOC)


-Elevated Troponin/NSTEMI, low suspicion for ACS given repeat Trop non 

concerning and absence of significant WMA on echo


-HTN


-Hypothroidism


-H/o Right breast ca s/p lumpectomy





Plan:


Cardiology input noted. Heparin drip x 48 hours. 


For stress MIBI.


Hold starting statin given mild CPK elevation. 


Fall precautions, PT eval, CT head neg


Resume bystolic. Resume norvasc based on BP readings. 


TSH normal, resume levothyroxine


PT eval


Dispo pending cardiac w/u and clinical improvement. 


Anticipate in 24-48 hours. 


Plan discussed with patient and daughter at bedside in detail, all questions 

answered.

## 2018-12-05 NOTE — PN
Progress Note, Physician


History of Present Illness: 





The patient is an 89F with a PMH of hypothyroidism, hypertension and right 

breast CA s/p right lumpectomy who presents to the ER with complaints of 

"feeling like she was going to fall". The patient states that she was in her 

normal state of health today and was ambulating to the bathroom without her 

walker. She began to develop a sensation that she was going to fall and slowly 

lowered herself to the ground. She denies head trauma and LOC. She also denies 

lightheadedness, room spinning sensation, CP, SOB, palpitations, nausea, 

vomiting, numbness, tingling, and weakness. 








- Current Medication List


Current Medications: 


Active Medications





Acetaminophen (Tylenol -)  650 mg PO Q6H PRN


   PRN Reason: PAIN LEVEL 1-5


Heparin Sodium (Porcine) (Heparin -)  5,000 unit IVPUSH PRN PRN


Heparin Sodium/Dextrose (Heparin Infusion -)  25,000 units in 500 mls @ 20 mls/

hr IVPB TITR JACOB; Protocol


   Last Titration: 12/05/18 08:50 Dose:  900 units/hr, 18 mls/hr











- Objective


Vital Signs: 


 Vital Signs











Temperature  98.3 F   12/05/18 08:59


 


Pulse Rate  66   12/05/18 08:59


 


Respiratory Rate  16   12/05/18 09:00


 


Blood Pressure  111/82   12/05/18 08:59


 


O2 Sat by Pulse Oximetry (%)  95   12/05/18 10:59











Eyes: Yes: WNL, Conjunctiva Clear, EOM Intact


HENT: Yes: WNL, Atraumatic, Normocephalic


Neck: Yes: WNL, Supple, Trachea Midline


Cardiovascular: Yes: WNL, Regular Rate and Rhythm


Respiratory: Yes: WNL, Regular, CTA Bilaterally


Gastrointestinal: Yes: WNL, Normal Bowel Sounds


Genitourinary: Yes: WNL


Musculoskeletal: Yes: WNL


Extremities: Yes: WNL


Edema: No


Integumentary: Yes: WNL


Neurological: Yes: WNL, Alert, Oriented


...Motor Strength: WNL


Psychiatric: Yes: WNL


Labs: 


 CBC, BMP





 12/05/18 04:30 





 12/05/18 04:30 





 INR, PTT











INR  1.03  (0.82-1.09)   12/04/18  16:25    








 Laboratory Tests











  12/04/18 12/04/18 12/04/18





  15:55 15:55 15:55


 


WBC  7.9  


 


RBC  4.82  


 


Hgb  14.4  


 


Hct  43.2  


 


MCV  89.6  


 


MCH  29.9  


 


MCHC  33.4  


 


RDW  12.9  


 


Plt Count  271  


 


MPV  8.4  


 


Absolute Neuts (auto)  5.8  


 


Neutrophils %  71.9  


 


Lymphocytes %  16.8  


 


Monocytes %  8.9  


 


Eosinophils %  1.9  


 


Basophils %  0.5  


 


PT with INR   


 


INR   


 


PTT (Actin FS)   


 


Sodium   133 L 


 


Potassium   4.1  D 


 


Chloride   103 


 


Carbon Dioxide   24 


 


Anion Gap   6 L 


 


BUN   20 H 


 


Creatinine   0.8 


 


Creat Clearance w eGFR   > 60 


 


Random Glucose   99 


 


Calcium   9.0 


 


Phosphorus   


 


Magnesium   


 


Total Bilirubin   0.7 


 


AST   23  D 


 


ALT   14 


 


Alkaline Phosphatase   59 


 


Creatine Kinase   Cancelled 


 


Creatine Kinase Index   


 


CK-MB (CK-2)   


 


Troponin I   Cancelled  1.13 H*


 


Total Protein   7.6 


 


Albumin   3.9 


 


Triglycerides   


 


Cholesterol   


 


Total LDL Cholesterol   


 


HDL Cholesterol   


 


TSH   


 


Urine Color   


 


Urine Appearance   


 


Urine pH   


 


Ur Specific Gravity   


 


Urine Protein   


 


Urine Glucose (UA)   


 


Urine Ketones   


 


Urine Blood   


 


Urine Nitrite   


 


Urine Bilirubin   


 


Urine Urobilinogen   


 


Ur Leukocyte Esterase   


 


Urine RBC   


 


Urine WBC   


 


Ur Epithelial Cells   


 


Urine Bacteria   














  12/04/18 12/04/18 12/04/18





  15:55 16:04 16:25


 


WBC   


 


RBC   


 


Hgb   


 


Hct   


 


MCV   


 


MCH   


 


MCHC   


 


RDW   


 


Plt Count   


 


MPV   


 


Absolute Neuts (auto)   


 


Neutrophils %   


 


Lymphocytes %   


 


Monocytes %   


 


Eosinophils %   


 


Basophils %   


 


PT with INR    11.5


 


INR    1.03


 


PTT (Actin FS)   


 


Sodium   


 


Potassium   


 


Chloride   


 


Carbon Dioxide   


 


Anion Gap   


 


BUN   


 


Creatinine   


 


Creat Clearance w eGFR   


 


Random Glucose   


 


Calcium   


 


Phosphorus   


 


Magnesium   


 


Total Bilirubin   


 


AST   


 


ALT   


 


Alkaline Phosphatase   


 


Creatine Kinase  287 H  


 


Creatine Kinase Index  1.6  


 


CK-MB (CK-2)  4.6 H  


 


Troponin I   


 


Total Protein   


 


Albumin   


 


Triglycerides   


 


Cholesterol   


 


Total LDL Cholesterol   


 


HDL Cholesterol   


 


TSH   


 


Urine Color   Yellow 


 


Urine Appearance   Clear 


 


Urine pH   6.5 


 


Ur Specific Gravity   1.020 


 


Urine Protein   Negative 


 


Urine Glucose (UA)   Negative 


 


Urine Ketones   Negative 


 


Urine Blood   Trace-lysed H 


 


Urine Nitrite   Negative 


 


Urine Bilirubin   Negative 


 


Urine Urobilinogen   0.2 


 


Ur Leukocyte Esterase   Negative 


 


Urine RBC   2-5 


 


Urine WBC   0-2 


 


Ur Epithelial Cells   1+ 


 


Urine Bacteria   1+ 














  12/04/18 12/04/18 12/05/18





  16:30 16:45 04:30


 


WBC   


 


RBC   


 


Hgb   


 


Hct   


 


MCV   


 


MCH   


 


MCHC   


 


RDW   


 


Plt Count   


 


MPV   


 


Absolute Neuts (auto)   


 


Neutrophils %   


 


Lymphocytes %   


 


Monocytes %   


 


Eosinophils %   


 


Basophils %   


 


PT with INR   


 


INR   


 


PTT (Actin FS)  26.6  


 


Sodium    140


 


Potassium    4.0


 


Chloride    106


 


Carbon Dioxide    27


 


Anion Gap    7 L


 


BUN    15


 


Creatinine    0.8


 


Creat Clearance w eGFR    > 60


 


Random Glucose    96


 


Calcium    8.5


 


Phosphorus    3.4


 


Magnesium    2.2


 


Total Bilirubin   


 


AST   


 


ALT   


 


Alkaline Phosphatase   


 


Creatine Kinase   


 


Creatine Kinase Index   


 


CK-MB (CK-2)   


 


Troponin I    0.09 H


 


Total Protein   


 


Albumin   


 


Triglycerides   104  118


 


Cholesterol   241  210 H


 


Total LDL Cholesterol   173  143 H


 


HDL Cholesterol   47  45


 


TSH   0.87 


 


Urine Color   


 


Urine Appearance   


 


Urine pH   


 


Ur Specific Gravity   


 


Urine Protein   


 


Urine Glucose (UA)   


 


Urine Ketones   


 


Urine Blood   


 


Urine Nitrite   


 


Urine Bilirubin   


 


Urine Urobilinogen   


 


Ur Leukocyte Esterase   


 


Urine RBC   


 


Urine WBC   


 


Ur Epithelial Cells   


 


Urine Bacteria   














  12/05/18 12/05/18 12/05/18





  04:30 04:30 11:00


 


WBC  8.3  


 


RBC  4.29  


 


Hgb  13.2  


 


Hct  37.2  


 


MCV  86.6  


 


MCH  30.7  


 


MCHC  35.4  


 


RDW  13.6  


 


Plt Count  247  


 


MPV  7.8  


 


Absolute Neuts (auto)   


 


Neutrophils %   


 


Lymphocytes %   


 


Monocytes %   


 


Eosinophils %   


 


Basophils %   


 


PT with INR   


 


INR   


 


PTT (Actin FS)   100.5 H 


 


Sodium   


 


Potassium   


 


Chloride   


 


Carbon Dioxide   


 


Anion Gap   


 


BUN   


 


Creatinine   


 


Creat Clearance w eGFR   


 


Random Glucose   


 


Calcium   


 


Phosphorus   


 


Magnesium   


 


Total Bilirubin   


 


AST   


 


ALT   


 


Alkaline Phosphatase   


 


Creatine Kinase    356 H


 


Creatine Kinase Index    0.7


 


CK-MB (CK-2)    2.7


 


Troponin I    0.05


 


Total Protein   


 


Albumin   


 


Triglycerides   


 


Cholesterol   


 


Total LDL Cholesterol   


 


HDL Cholesterol   


 


TSH   


 


Urine Color   


 


Urine Appearance   


 


Urine pH   


 


Ur Specific Gravity   


 


Urine Protein   


 


Urine Glucose (UA)   


 


Urine Ketones   


 


Urine Blood   


 


Urine Nitrite   


 


Urine Bilirubin   


 


Urine Urobilinogen   


 


Ur Leukocyte Esterase   


 


Urine RBC   


 


Urine WBC   


 


Ur Epithelial Cells   


 


Urine Bacteria   














Assessment/Plan





hypothyroidism, hypertension and right breast CA s/p right lumpectomy who 

presents to the ER with complaints of "feeling like she was going to fall". 


TNIs positive 1.13 normalized since





echo nl


ekg sr wnl











Plan








asa


heparin x 48 h


Lexiscan mibi st for risk stratification


f/u ekgs and tnis

## 2018-12-05 NOTE — EKG
Test Reason : 

Blood Pressure : ***/*** mmHG

Vent. Rate : 071 BPM     Atrial Rate : 071 BPM

   P-R Int : 174 ms          QRS Dur : 088 ms

    QT Int : 426 ms       P-R-T Axes : 028 -09 -05 degrees

   QTc Int : 462 ms

 

NORMAL SINUS RHYTHM

NORMAL ECG

WHEN COMPARED WITH ECG OF 08-MAY-2017 13:50,

NONSPECIFIC T WAVE ABNORMALITY NO LONGER EVIDENT IN ANTERIOR LEADS

Confirmed by KRISHAN WATERS, DAVIDSON ([id number]) on 12/5/2018 10:14:13 AM

 

Referred By: DR RODRIGUEZ           Confirmed By:DAVIDSON NICKERSON MD

## 2018-12-05 NOTE — PN
Physical Exam: 


SUBJECTIVE: Patient seen and examined at bedside- no acute events overnight. 

patient states she is feeling ok still not at her baseline thoug. however, she 

denies any CP/SOB/N/V fevers or chills








OBJECTIVE:





 Vital Signs











 Period  Temp  Pulse  Resp  BP Sys/Hui  Pulse Ox


 


 Last 24 Hr  97.1 F-98.3 F  66-73  16-20  111-176/65-84  95-99











GENERAL: The patient is awake, alert, and fully oriented, in no acute distress.


EYES: no scleral icterus.


NECK: Trachea midline, full range of motion, supple. 


LUNGS: CTA B/L; no rales, rhonchi or wheezing. 


HEART: Regular rate and rhythm, S1, S2 without murmur, rub or gallop.


ABDOMEN: Soft, nontender, nondistended, normoactive bowel sounds, no guarding, 

no 


rebound, no hepatosplenomegaly, no masses.


EXTREMITIES: 2+ pulses, warm, well-perfused, 1+ edema b?l; torturous veins. .


SKIN: Warm, dry, normal turgor, no rashes or lesions noted














 Laboratory Results - last 24 hr











  12/04/18 12/04/18 12/04/18





  15:55 15:55 15:55


 


WBC  7.9  


 


RBC  4.82  


 


Hgb  14.4  


 


Hct  43.2  


 


MCV  89.6  


 


MCH  29.9  


 


MCHC  33.4  


 


RDW  12.9  


 


Plt Count  271  


 


MPV  8.4  


 


Absolute Neuts (auto)  5.8  


 


Neutrophils %  71.9  


 


Lymphocytes %  16.8  


 


Monocytes %  8.9  


 


Eosinophils %  1.9  


 


Basophils %  0.5  


 


PT with INR   


 


INR   


 


PTT (Actin FS)   


 


Sodium   133 L 


 


Potassium   4.1  D 


 


Chloride   103 


 


Carbon Dioxide   24 


 


Anion Gap   6 L 


 


BUN   20 H 


 


Creatinine   0.8 


 


Creat Clearance w eGFR   > 60 


 


Random Glucose   99 


 


Calcium   9.0 


 


Phosphorus   


 


Magnesium   


 


Total Bilirubin   0.7 


 


AST   23  D 


 


ALT   14 


 


Alkaline Phosphatase   59 


 


Creatine Kinase   Cancelled 


 


Creatine Kinase Index   


 


CK-MB (CK-2)   


 


Troponin I   Cancelled  1.13 H*


 


Total Protein   7.6 


 


Albumin   3.9 


 


Triglycerides   


 


Cholesterol   


 


Total LDL Cholesterol   


 


HDL Cholesterol   


 


TSH   


 


Urine Color   


 


Urine Appearance   


 


Urine pH   


 


Ur Specific Gravity   


 


Urine Protein   


 


Urine Glucose (UA)   


 


Urine Ketones   


 


Urine Blood   


 


Urine Nitrite   


 


Urine Bilirubin   


 


Urine Urobilinogen   


 


Ur Leukocyte Esterase   


 


Urine RBC   


 


Urine WBC   


 


Ur Epithelial Cells   


 


Urine Bacteria   














  12/04/18 12/04/18 12/04/18





  15:55 16:04 16:25


 


WBC   


 


RBC   


 


Hgb   


 


Hct   


 


MCV   


 


MCH   


 


MCHC   


 


RDW   


 


Plt Count   


 


MPV   


 


Absolute Neuts (auto)   


 


Neutrophils %   


 


Lymphocytes %   


 


Monocytes %   


 


Eosinophils %   


 


Basophils %   


 


PT with INR    11.5


 


INR    1.03


 


PTT (Actin FS)   


 


Sodium   


 


Potassium   


 


Chloride   


 


Carbon Dioxide   


 


Anion Gap   


 


BUN   


 


Creatinine   


 


Creat Clearance w eGFR   


 


Random Glucose   


 


Calcium   


 


Phosphorus   


 


Magnesium   


 


Total Bilirubin   


 


AST   


 


ALT   


 


Alkaline Phosphatase   


 


Creatine Kinase  287 H  


 


Creatine Kinase Index  1.6  


 


CK-MB (CK-2)  4.6 H  


 


Troponin I   


 


Total Protein   


 


Albumin   


 


Triglycerides   


 


Cholesterol   


 


Total LDL Cholesterol   


 


HDL Cholesterol   


 


TSH   


 


Urine Color   Yellow 


 


Urine Appearance   Clear 


 


Urine pH   6.5 


 


Ur Specific Gravity   1.020 


 


Urine Protein   Negative 


 


Urine Glucose (UA)   Negative 


 


Urine Ketones   Negative 


 


Urine Blood   Trace-lysed H 


 


Urine Nitrite   Negative 


 


Urine Bilirubin   Negative 


 


Urine Urobilinogen   0.2 


 


Ur Leukocyte Esterase   Negative 


 


Urine RBC   2-5 


 


Urine WBC   0-2 


 


Ur Epithelial Cells   1+ 


 


Urine Bacteria   1+ 














  12/04/18 12/04/18 12/05/18





  16:30 16:45 04:30


 


WBC   


 


RBC   


 


Hgb   


 


Hct   


 


MCV   


 


MCH   


 


MCHC   


 


RDW   


 


Plt Count   


 


MPV   


 


Absolute Neuts (auto)   


 


Neutrophils %   


 


Lymphocytes %   


 


Monocytes %   


 


Eosinophils %   


 


Basophils %   


 


PT with INR   


 


INR   


 


PTT (Actin FS)  26.6  


 


Sodium    140


 


Potassium    4.0


 


Chloride    106


 


Carbon Dioxide    27


 


Anion Gap    7 L


 


BUN    15


 


Creatinine    0.8


 


Creat Clearance w eGFR    > 60


 


Random Glucose    96


 


Calcium    8.5


 


Phosphorus    3.4


 


Magnesium    2.2


 


Total Bilirubin   


 


AST   


 


ALT   


 


Alkaline Phosphatase   


 


Creatine Kinase   


 


Creatine Kinase Index   


 


CK-MB (CK-2)   


 


Troponin I    0.09 H


 


Total Protein   


 


Albumin   


 


Triglycerides   104  118


 


Cholesterol   241  210 H


 


Total LDL Cholesterol   173  143 H


 


HDL Cholesterol   47  45


 


TSH   0.87 


 


Urine Color   


 


Urine Appearance   


 


Urine pH   


 


Ur Specific Gravity   


 


Urine Protein   


 


Urine Glucose (UA)   


 


Urine Ketones   


 


Urine Blood   


 


Urine Nitrite   


 


Urine Bilirubin   


 


Urine Urobilinogen   


 


Ur Leukocyte Esterase   


 


Urine RBC   


 


Urine WBC   


 


Ur Epithelial Cells   


 


Urine Bacteria   














  12/05/18 12/05/18 12/05/18





  04:30 04:30 11:00


 


WBC  8.3  


 


RBC  4.29  


 


Hgb  13.2  


 


Hct  37.2  


 


MCV  86.6  


 


MCH  30.7  


 


MCHC  35.4  


 


RDW  13.6  


 


Plt Count  247  


 


MPV  7.8  


 


Absolute Neuts (auto)   


 


Neutrophils %   


 


Lymphocytes %   


 


Monocytes %   


 


Eosinophils %   


 


Basophils %   


 


PT with INR   


 


INR   


 


PTT (Actin FS)   100.5 H 


 


Sodium   


 


Potassium   


 


Chloride   


 


Carbon Dioxide   


 


Anion Gap   


 


BUN   


 


Creatinine   


 


Creat Clearance w eGFR   


 


Random Glucose   


 


Calcium   


 


Phosphorus   


 


Magnesium   


 


Total Bilirubin   


 


AST   


 


ALT   


 


Alkaline Phosphatase   


 


Creatine Kinase    356 H


 


Creatine Kinase Index    0.7


 


CK-MB (CK-2)    2.7


 


Troponin I    0.05


 


Total Protein   


 


Albumin   


 


Triglycerides   


 


Cholesterol   


 


Total LDL Cholesterol   


 


HDL Cholesterol   


 


TSH   


 


Urine Color   


 


Urine Appearance   


 


Urine pH   


 


Ur Specific Gravity   


 


Urine Protein   


 


Urine Glucose (UA)   


 


Urine Ketones   


 


Urine Blood   


 


Urine Nitrite   


 


Urine Bilirubin   


 


Urine Urobilinogen   


 


Ur Leukocyte Esterase   


 


Urine RBC   


 


Urine WBC   


 


Ur Epithelial Cells   


 


Urine Bacteria   








Active Medications











Generic Name Dose Route Start Last Admin





  Trade Name David  PRN Reason Stop Dose Admin


 


Acetaminophen  650 mg  12/05/18 04:46  





  Tylenol -  PO   





  Q6H PRN   





  PAIN LEVEL 1-5   





     





     





     


 


Heparin Sodium (Porcine)  5,000 unit  12/04/18 17:56  





  Heparin -  IVPUSH   





  PRN PRN   





     





     





     





     


 


Heparin Sodium/Dextrose  25,000 units in 500 mls @ 20 mls/hr  12/04/18 18:00  12 /05/18 08:50





  Heparin Infusion -  IVPB   900 units/hr





  TITR JACOB   18 mls/hr





     Titration





     





  Protocol   





  1,000 UNITS/HR   











ASSESSMENT/PLAN:


88 yo female with PMH of HTN, Hypothyroidism, R Breast CA (S/P lumpectomy) 

admitted with complaint of a fall after not feeling well, positive troponin, no 

EKG changes.








NSTEMI


-EKG noted with no ST/Twave abnormalities


-Troponin peaked at 1.13 and is downtrending latest being 0.05


-Cardiology consulted


-Heparin drip begun for 48 hours


-as per cardio will do maxwell scan


-echo normal





HTN


-Norvasc 2.5 mg PO Daily


-Elevated in ED to 176/84, currently stable at 152/79





Hypothyroidism


-100 mcg Synthroid PO Daily





DVT Prophylaxis


-On heparin drip





FEN


-Fluids: None


-Electrolytes: No electrolyte abnormalities, BMP in AM


-Nutrition: Na controlled diet





Problem List





- Problems


(1) NSTEMI (non-ST elevated myocardial infarction)


Code(s): I21.4 - NON-ST ELEVATION (NSTEMI) MYOCARDIAL INFARCTION   





(2) Hypertension


Code(s): I10 - ESSENTIAL (PRIMARY) HYPERTENSION   





(3) Hypothyroidism


Code(s): E03.9 - HYPOTHYROIDISM, UNSPECIFIED   





Visit type





- Emergency Visit


Emergency Visit: Yes


ED Registration Date: 12/04/18


Care time: The patient presented to the Emergency Department on the above date 

and was hospitalized for further evaluation of their emergent condition.





- New Patient


This patient is new to me today: Yes


Date on this admission: 12/05/18





- Critical Care


Critical Care patient: No

## 2018-12-05 NOTE — HP
CHIEF COMPLAINT: Fall, Not feeling well





PCP: Sisi





HISTORY OF PRESENT ILLNESS:


89 yo female with PMH of HTN, Hypothyroidism, R Breast CA (S/P lumpectomy) 

admitted with complaint of a fall after not feeling well. She states she has 

not had any episodes like this in the past and denies any sort of chest pain or 

shortness of breath. She states she simply did not feel well and had a fall. 

She does not recall if she lost consciousness or hit her head. She currently 

has no complaints and is resting in bed comfortably. 





ER course was notable for:


(1) Head CT negative


(2) EKG normal sinus with no concerning ST/Twave abnormalities


(3) Troponin 1.13, Cardiology consulted, started on heparin drip, transferred 

from [facility] to [facility]





Recent Travel:


none


PAST MEDICAL HISTORY:


HTN, Hypothyroidism, R Breast CA (S/P lumpectomy)


PAST SURGICAL HISTORY:


Right breast lumpectomy


Social History:


Smoking: Former, quit > 30 years ago


Alcohol: Very rarely socially


Drugs: Denies





Family History:


Allergies





cortisone Allergy (Intermediate, Verified 12/04/18 15:28)


 Swelling


Penicillins Allergy (Verified 12/04/18 15:28)


 


nickel [Nickel] Adverse Reaction (Mild, Verified 12/04/18 15:28)


 Rash


peniciliin Allergy (Intermediate, Uncoded 05/08/17 12:43)


 


Peaches  Adverse Reaction (Mild, Uncoded 05/08/17 12:43)


 redness Face








HOME MEDICATIONS:


 Home Medications











 Medication  Instructions  Recorded


 


Acetaminophen [Tylenol Extra 500 mg PO PRN 05/06/17





Strength]  


 


Amlodipine Besylate [Norvasc -] 2.5 mg PO DAILY 05/06/17


 


Levothyroxine [Synthroid -] 100 mcg PO DAILY 05/06/17


 


Nebivolol HCl [Bystolic] 10 mg DAILY 05/06/17


 


Methyl Salicylate/Menthol Oint 1 applic TP BID #30 applic 05/10/17





[Analgesic [place] -]  








REVIEW OF SYSTEMS


CONSTITUTIONAL: 


Absent:  fever, chills, diaphoresis, generalized weakness, malaise, loss of 

appetite, weight change


HEENT: 


Absent:  rhinorrhea, nasal congestion, throat pain, throat swelling, difficulty 

swallowing, mouth swelling, ear pain, eye pain, visual changes


CARDIOVASCULAR: 


Absent: chest pain, syncope, palpitations, irregular heart rate, lightheadedness

, peripheral edema


RESPIRATORY: 


Absent: cough, shortness of breath, dyspnea with exertion, orthopnea, wheezing, 

stridor, hemoptysis


GASTROINTESTINAL:


Absent: abdominal pain, abdominal distension, nausea, vomiting, diarrhea, 

constipation, melena, hematochezia


GENITOURINARY: 


Absent: dysuria, frequency, urgency, hesitancy, hematuria, flank pain, genital 

pain


MUSCULOSKELETAL: 


Absent: myalgia, arthralgia, joint swelling, back pain, neck pain


SKIN: 


Absent: rash, itching, pallor


HEMATOLOGIC/IMMUNOLOGIC: 


Absent: easy bleeding, easy bruising, lymphadenopathy, frequent infections


ENDOCRINE:


Absent: unexplained weight gain, unexplained weight loss, heat intolerance, 

cold intolerance


NEUROLOGIC: 


Absent: headache, focal weakness or paresthesias, dizziness, unsteady gait, 

seizure, mental status changes, bladder or bowel incontinence


PSYCHIATRIC: 


Absent: anxiety, depression, suicidal or homicidal ideation, hallucinations.








PHYSICAL EXAMINATION


 Vital Signs - 24 hr











  12/04/18 12/04/18 12/04/18





  15:27 18:21 19:49


 


Temperature 97.7 F  97.9 F


 


Pulse Rate 73  


 


Pulse Rate [  70 71





Apical]   


 


Respiratory 16 19 18





Rate   


 


Blood Pressure 156/80  


 


Blood Pressure  176/84 H 159/71





[Arm]   


 


O2 Sat by Pulse 97 98 99





Oximetry (%)   














  12/05/18 12/05/18





  02:00 03:27


 


Temperature 97.5 F L 97.2 F L


 


Pulse Rate 67 66


 


Pulse Rate [  





Apical]  


 


Respiratory 20 20





Rate  


 


Blood Pressure 152/79 138/65


 


Blood Pressure  





[Arm]  


 


O2 Sat by Pulse  96





Oximetry (%)  

















GENERAL: A&O, no acute distress


HEAD: Normocephalic, atraumatic.


EYES: PERRL, no scleral icterus


EARS, NOSE, THROAT: oropharynx clear without exudates. Moist mucous membranes.


NECK: supple without lymphadenopathy


LUNGS: CTA b/l, no crackles or wheezes


HEART: Regular rate and rhythm, normal S1 and S2 without murmur


ABDOMEN: Soft, nontender to palpation, normoactive bowel sounds


EXTREMITIES: 2+ pulses, warm, well-perfused. No peripheral edema. Small donut 

pad on right foot she states was placed by podiatry recently


NEUROLOGICAL:  Cranial nerves II-XII grossly intact. Normal speech. 


PSYCHIATRIC: Cooperative. Good eye contact. Appropriate mood and affect.

















 Laboratory Results - last 24 hr











  12/04/18 12/04/18 12/04/18





  15:55 15:55 15:55


 


WBC  7.9  


 


RBC  4.82  


 


Hgb  14.4  


 


Hct  43.2  


 


MCV  89.6  


 


MCH  29.9  


 


MCHC  33.4  


 


RDW  12.9  


 


Plt Count  271  


 


MPV  8.4  


 


Absolute Neuts (auto)  5.8  


 


Neutrophils %  71.9  


 


Lymphocytes %  16.8  


 


Monocytes %  8.9  


 


Eosinophils %  1.9  


 


Basophils %  0.5  


 


PT with INR   


 


INR   


 


PTT (Actin FS)   


 


Sodium   133 L 


 


Potassium   4.1  D 


 


Chloride   103 


 


Carbon Dioxide   24 


 


Anion Gap   6 L 


 


BUN   20 H 


 


Creatinine   0.8 


 


Creat Clearance w eGFR   > 60 


 


Random Glucose   99 


 


Calcium   9.0 


 


Total Bilirubin   0.7 


 


AST   23  D 


 


ALT   14 


 


Alkaline Phosphatase   59 


 


Creatine Kinase   Cancelled 


 


Creatine Kinase Index   


 


CK-MB (CK-2)   


 


Troponin I   Cancelled  1.13 H*


 


Total Protein   7.6 


 


Albumin   3.9 


 


Triglycerides   


 


Cholesterol   


 


Total LDL Cholesterol   


 


HDL Cholesterol   


 


TSH   


 


Urine Color   


 


Urine Appearance   


 


Urine pH   


 


Ur Specific Gravity   


 


Urine Protein   


 


Urine Glucose (UA)   


 


Urine Ketones   


 


Urine Blood   


 


Urine Nitrite   


 


Urine Bilirubin   


 


Urine Urobilinogen   


 


Ur Leukocyte Esterase   


 


Urine RBC   


 


Urine WBC   


 


Ur Epithelial Cells   


 


Urine Bacteria   














  12/04/18 12/04/18 12/04/18





  15:55 16:04 16:25


 


WBC   


 


RBC   


 


Hgb   


 


Hct   


 


MCV   


 


MCH   


 


MCHC   


 


RDW   


 


Plt Count   


 


MPV   


 


Absolute Neuts (auto)   


 


Neutrophils %   


 


Lymphocytes %   


 


Monocytes %   


 


Eosinophils %   


 


Basophils %   


 


PT with INR    11.5


 


INR    1.03


 


PTT (Actin FS)   


 


Sodium   


 


Potassium   


 


Chloride   


 


Carbon Dioxide   


 


Anion Gap   


 


BUN   


 


Creatinine   


 


Creat Clearance w eGFR   


 


Random Glucose   


 


Calcium   


 


Total Bilirubin   


 


AST   


 


ALT   


 


Alkaline Phosphatase   


 


Creatine Kinase  287 H  


 


Creatine Kinase Index  1.6  


 


CK-MB (CK-2)  4.6 H  


 


Troponin I   


 


Total Protein   


 


Albumin   


 


Triglycerides   


 


Cholesterol   


 


Total LDL Cholesterol   


 


HDL Cholesterol   


 


TSH   


 


Urine Color   Yellow 


 


Urine Appearance   Clear 


 


Urine pH   6.5 


 


Ur Specific Gravity   1.020 


 


Urine Protein   Negative 


 


Urine Glucose (UA)   Negative 


 


Urine Ketones   Negative 


 


Urine Blood   Trace-lysed H 


 


Urine Nitrite   Negative 


 


Urine Bilirubin   Negative 


 


Urine Urobilinogen   0.2 


 


Ur Leukocyte Esterase   Negative 


 


Urine RBC   2-5 


 


Urine WBC   0-2 


 


Ur Epithelial Cells   1+ 


 


Urine Bacteria   1+ 














  12/04/18 12/04/18





  16:30 16:45


 


WBC  


 


RBC  


 


Hgb  


 


Hct  


 


MCV  


 


MCH  


 


MCHC  


 


RDW  


 


Plt Count  


 


MPV  


 


Absolute Neuts (auto)  


 


Neutrophils %  


 


Lymphocytes %  


 


Monocytes %  


 


Eosinophils %  


 


Basophils %  


 


PT with INR  


 


INR  


 


PTT (Actin FS)  26.6 


 


Sodium  


 


Potassium  


 


Chloride  


 


Carbon Dioxide  


 


Anion Gap  


 


BUN  


 


Creatinine  


 


Creat Clearance w eGFR  


 


Random Glucose  


 


Calcium  


 


Total Bilirubin  


 


AST  


 


ALT  


 


Alkaline Phosphatase  


 


Creatine Kinase  


 


Creatine Kinase Index  


 


CK-MB (CK-2)  


 


Troponin I  


 


Total Protein  


 


Albumin  


 


Triglycerides   104


 


Cholesterol   241


 


Total LDL Cholesterol   173


 


HDL Cholesterol   47


 


TSH   0.87


 


Urine Color  


 


Urine Appearance  


 


Urine pH  


 


Ur Specific Gravity  


 


Urine Protein  


 


Urine Glucose (UA)  


 


Urine Ketones  


 


Urine Blood  


 


Urine Nitrite  


 


Urine Bilirubin  


 


Urine Urobilinogen  


 


Ur Leukocyte Esterase  


 


Urine RBC  


 


Urine WBC  


 


Ur Epithelial Cells  


 


Urine Bacteria  











ASSESSMENT/PLAN:


89 yo female with PMH of HTN, Hypothyroidism, R Breast CA (S/P lumpectomy) 

admitted with complaint of a fall after not feeling well, positive troponin, no 

EKG changes.








NSTEMI


-EKG noted with no ST/Twave abnormalities


-Troponin 1.13, repeat trending


-Cardiology consulted


-Heparin drip begun


-Will await further recommendations from Cardiology





HTN


-Norvasc 2.5 mg PO Daily


-Elevated in ED to 176/84, currently stable at 152/79





Hypothyroidism


-100 mcg Synthroid PO Daily





DVT Prophylaxis


-On heparin drip





FEN


-Fluids: None


-Electrolytes: No electrolyte abnormalities, BMP in AM


-Nutrition: Na controlled diet





Disposition


Telemetry





Visit type





- Emergency Visit


Emergency Visit: Yes


ED Registration Date: 12/04/18


Care time: The patient presented to the Emergency Department on the above date 

and was hospitalized for further evaluation of their emergent condition.





- New Patient


This patient is new to me today: Yes


Date on this admission: 12/05/18





- Critical Care


Critical Care patient: No

## 2018-12-06 LAB
ANION GAP SERPL CALC-SCNC: 8 MMOL/L (ref 8–16)
BUN SERPL-MCNC: 14 MG/DL (ref 7–18)
CALCIUM SERPL-MCNC: 7.9 MG/DL (ref 8.5–10.1)
CHLORIDE SERPL-SCNC: 105 MMOL/L (ref 98–107)
CO2 SERPL-SCNC: 25 MMOL/L (ref 21–32)
CREAT SERPL-MCNC: 0.8 MG/DL (ref 0.55–1.3)
DEPRECATED RDW RBC AUTO: 13.6 % (ref 11.6–15.6)
GLUCOSE SERPL-MCNC: 96 MG/DL (ref 74–106)
HCT VFR BLD CALC: 40.3 % (ref 32.4–45.2)
HGB BLD-MCNC: 12.9 GM/DL (ref 10.7–15.3)
MAGNESIUM SERPL-MCNC: 2.1 MG/DL (ref 1.8–2.4)
MCH RBC QN AUTO: 28.3 PG (ref 25.7–33.7)
MCHC RBC AUTO-ENTMCNC: 31.9 G/DL (ref 32–36)
MCV RBC: 88.6 FL (ref 80–96)
PHOSPHATE SERPL-MCNC: 3.3 MG/DL (ref 2.5–4.9)
PLATELET # BLD AUTO: 236 K/MM3 (ref 134–434)
PMV BLD: 8.6 FL (ref 7.5–11.1)
POTASSIUM SERPLBLD-SCNC: 3.7 MMOL/L (ref 3.5–5.1)
RBC # BLD AUTO: 4.54 M/MM3 (ref 3.6–5.2)
SODIUM SERPL-SCNC: 138 MMOL/L (ref 136–145)
WBC # BLD AUTO: 7.8 K/MM3 (ref 4–10)

## 2018-12-06 RX ADMIN — LEVOTHYROXINE SODIUM SCH MCG: 75 TABLET ORAL at 06:55

## 2018-12-06 RX ADMIN — ACETAMINOPHEN PRN MG: 325 TABLET ORAL at 17:07

## 2018-12-06 RX ADMIN — HEPARIN SODIUM SCH MLS/HR: 5000 INJECTION, SOLUTION INTRAVENOUS at 19:08

## 2018-12-06 RX ADMIN — AMLODIPINE BESYLATE SCH MG: 2.5 TABLET ORAL at 12:55

## 2018-12-06 NOTE — PN
Progress Note, Physician


Chief Complaint: 





Pt without chest pain; awaits stress MIBI today.


History of Present Illness: 





89y F hx of hypothyroidism, hypertension presents with possible syncope while 

walking.  The patients states she woke up feeling fine this morning, sometime 

after 1pm (when her doughter left) she started feeling genearlized weakenss. 

she somehow ended up on the floor - does not recall specifically if she laid 

down.  denies any associated cp, palpitations, ligheahdedess, cough, sob, 

hemoptysis,, increased leg swelling,a bd pain, back pain.  She called EMS 

herself who sent her to the ED.  pt thinks she was down approx 1 hr but is 

uncertain. currently she is endorsing a mild headche, also endorses slight R 

arm discomfort. 





USOH this mraster, ambulating to the restroom, felt like she was going to fall 

so she sat down slowly


denies any lightheadedness, vertigo, cp, sob, palpitations, n/v, nmnbess/

tingling/weakness. 


f/c, cough, diarrhea, melena/bpr, 


pt notse mild headache





PMD  











- Current Medication List


Current Medications: 


Active Medications





Acetaminophen (Tylenol -)  650 mg PO Q6H PRN


   PRN Reason: PAIN LEVEL 1-5


   Last Admin: 12/05/18 14:37 Dose:  650 mg


Amlodipine Besylate (Norvasc -)  2.5 mg PO DAILY [facility], 1928–1983


Heparin Sodium (Porcine) (Heparin -)  5,000 unit IVPUSH PRN PRN


Heparin Sodium/Dextrose (Heparin Infusion -)  25,000 units in 500 mls @ 20 mls/

hr IVPB TITR JACOB; Protocol


   Last Admin: 12/05/18 18:17 Dose:  900 units/hr, 18 mls/hr


Levothyroxine Sodium (Synthroid -)  75 mcg PO DAILY@0700 [facility], [facility]–1983


   Last Admin: 12/06/18 06:55 Dose:  75 mcg


Nebivolol (Bystolic -)  5 mg PO DAILY JACOB


Regadenoson (Lexiscan)  0.4 mg IVPUSH ONCE ONE


   Stop: 12/06/18 10:31











- Objective


Vital Signs: 


 Vital Signs











Temperature  98.1 F   12/06/18 09:00


 


Pulse Rate  66   12/06/18 09:00


 


Respiratory Rate  20   12/06/18 09:00


 


Blood Pressure  122/58 L  12/06/18 09:00


 


O2 Sat by Pulse Oximetry (%)  96   12/06/18 09:00











Labs: 


 CBC, BMP





 12/06/18 05:30 





 12/06/18 05:30 





 INR, PTT











INR  1.03  (0.82-1.09)   12/04/18  16:25    














Problem List





- Problems


(1) NSTEMI (non-ST elevated myocardial infarction)


Assessment/Plan: 


1st TNI 1.1; next levels were normal. called lab to see if initial finding can 

be rechecked: it was , and was confirmed as being 1.1, and 1.3 when checked 

less than an hour later using another blood sample. The following morning, TNI 

was 0.09. R/u transient coronary obstruction; lexiscan stress MIBI pending.


EKG: normal study.


ECHO: normal LVEF


IV heparin: can d/c if Lexiscan results show no significant myocardial injury 

or ischemia


ASA


Statin: LDL cholester >170; start atorvastatin 80 mg daily.





For stress Lexiscan MIBI today.


Code(s): I21.4 - NON-ST ELEVATION (NSTEMI) MYOCARDIAL INFARCTION   





(2) Syncope


Assessment/Plan: 


f/u orthostatic vital signs


Maintatin hydration


Carotid artery US


ECHO for LVEF, wall motion, r/o thrombus; valve status.


TSH


Code(s): R55 - SYNCOPE AND COLLAPSE   


Qualifiers: 


   Syncope type: unspecified   Qualified Code(s): R55 - Syncope and collapse   





(3) Advanced directives, counseling/discussion


Code(s): Z71.89 - OTHER SPECIFIED COUNSELING   





(4) Hypertension


Code(s): I10 - ESSENTIAL (PRIMARY) HYPERTENSION   





(5) Hypothyroidism


Assessment/Plan: 


TSH .87


Code(s): E03.9 - HYPOTHYROIDISM, UNSPECIFIED   





(6) Osteoarthritis


Code(s): M19.90 - UNSPECIFIED OSTEOARTHRITIS, UNSPECIFIED SITE

## 2018-12-06 NOTE — PN
Physical Exam: 


SUBJECTIVE: Patient seen and examined at bedside no acute events overnight; 

patient states she is feeling ok. had some minor back pain yesterday however is 

no longer having it; she denies any CP/SOB/N/V fevers or chills








OBJECTIVE:





 Vital Signs











 Period  Temp  Pulse  Resp  BP Sys/Hui  Pulse Ox


 


 Last 24 Hr  97.6 F-98.4 F  58-70  16-20  111-144/58-82  95-98











GENERAL: The patient is awake, alert, and fully oriented, in no acute distress..


EYES: no scleral icterus


NECK: no JVD, no lymphadenopathy 


LUNGS: CTA B/L; no rales, rhonchi or wheezing. 


HEART: Regular rate and rhythm, S1, S2 without murmur, rub or gallop.


ABDOMEN: Soft, nontender, nondistended, normoactive bowel sounds, no guarding, 

no 


rebound, no hepatosplenomegaly, no masses.


EXTREMITIES: 2+ pulses, warm, well-perfused, 2+ edema B/L. 


SKIN: Warm, dry, normal turgor, no rashes or lesions noted














 Laboratory Results - last 24 hr











  12/05/18 12/05/18 12/05/18





  04:30 11:00 16:00


 


WBC   


 


RBC   


 


Hgb   


 


Hct   


 


MCV   


 


MCH   


 


MCHC   


 


RDW   


 


Plt Count   


 


MPV   


 


PTT (Actin FS)    61.7 H


 


Sodium   


 


Potassium   


 


Chloride   


 


Carbon Dioxide   


 


Anion Gap   


 


BUN   


 


Creatinine   


 


Creat Clearance w eGFR   


 


Random Glucose   


 


Calcium   


 


Phosphorus   


 


Magnesium   


 


Creatine Kinase   356 H 


 


Creatine Kinase Index   0.7 


 


CK-MB (CK-2)   2.7 


 


Troponin I   0.05 


 


Triglycerides  118  


 


Cholesterol  210 H  


 


Total LDL Cholesterol  143 H  


 


HDL Cholesterol  45  














  12/05/18 12/06/18 12/06/18





  16:00 05:30 05:30


 


WBC   7.8 


 


RBC   4.54 


 


Hgb   12.9 


 


Hct   40.3 


 


MCV   88.6 


 


MCH   28.3 


 


MCHC   31.9 L 


 


RDW   13.6 


 


Plt Count   236 


 


MPV   8.6  D 


 


PTT (Actin FS)   


 


Sodium    138


 


Potassium    3.7


 


Chloride    105


 


Carbon Dioxide    25


 


Anion Gap    8


 


BUN    14


 


Creatinine    0.8


 


Creat Clearance w eGFR    > 60


 


Random Glucose    96


 


Calcium    7.9 L


 


Phosphorus    3.3


 


Magnesium    2.1


 


Creatine Kinase   


 


Creatine Kinase Index   


 


CK-MB (CK-2)   


 


Troponin I  0.04  


 


Triglycerides   


 


Cholesterol   


 


Total LDL Cholesterol   


 


HDL Cholesterol   








Active Medications











Generic Name Dose Route Start Last Admin





  Trade Name Freq  PRN Reason Stop Dose Admin


 


Acetaminophen  650 mg  12/05/18 04:46  12/05/18 14:37





  Tylenol -  PO   650 mg





  Q6H PRN   Administration





  PAIN LEVEL 1-5   





     





     





     


 


Amlodipine Besylate  2.5 mg  12/06/18 10:00  





  Norvasc -  PO   





  DAILY JACOB   





     





     





     





     


 


Heparin Sodium (Porcine)  5,000 unit  12/04/18 17:56  





  Heparin -  IVPUSH   





  PRN PRN   





     





     





     





     


 


Heparin Sodium/Dextrose  25,000 units in 500 mls @ 20 mls/hr  12/04/18 18:00  12 /05/18 18:17





  Heparin Infusion -  IVPB   900 units/hr





  TITR JACOB   18 mls/hr





     Administration





     





  Protocol   





  1,000 UNITS/HR   


 


Levothyroxine Sodium  75 mcg  12/06/18 07:00  12/06/18 06:55





  Synthroid -  PO   75 mcg





  DAILY@0700 JACOB   Administration





     





     





     





     


 


Nebivolol  5 mg  12/06/18 10:00  





  Bystolic -  PO   





  DAILY JACOB   





     





     





     





     











ASSESSMENT/PLAN:


89 yo female with PMH of HTN, Hypothyroidism, R Breast CA (S/P lumpectomy) 

admitted with complaint of a fall after not feeling well, positive troponin, no 

EKG changes.








NSTEMI


-EKG noted with no ST/Twave abnormalities


-Troponin peaked at 1.13 and is downtrending latest being 0.04


-Heparin drip begun for 48 hours


-maxwell scan ordered for today


-echo normal





HTN


-Norvasc 2.5 mg PO Daily


-Bystolic 5mg daily


-monitor pressure





Hypothyroidism


-100 mcg Synthroid PO Daily





DVT Prophylaxis


-On heparin drip





FEN


-Fluids: None


-Electrolytes: No electrolyte abnormalities, BMP in AM


-Nutrition: Na controlled diet





disps: possibly SNF





Problem List





- Problems


(1) NSTEMI (non-ST elevated myocardial infarction)


Code(s): I21.4 - NON-ST ELEVATION (NSTEMI) MYOCARDIAL INFARCTION   





(2) Hypertension


Code(s): I10 - ESSENTIAL (PRIMARY) HYPERTENSION   





(3) Hypothyroidism


Code(s): E03.9 - HYPOTHYROIDISM, UNSPECIFIED   





Visit type





- Emergency Visit


Emergency Visit: Yes


ED Registration Date: 12/04/18


Care time: The patient presented to the Emergency Department on the above date 

and was hospitalized for further evaluation of their emergent condition.





- New Patient


This patient is new to me today: No





- Critical Care


Critical Care patient: No

## 2018-12-06 NOTE — EKG
Test Reason : 

Blood Pressure : ***/*** mmHG

Vent. Rate : 072 BPM     Atrial Rate : 072 BPM

   P-R Int : 168 ms          QRS Dur : 088 ms

    QT Int : 432 ms       P-R-T Axes : 024 -05 003 degrees

   QTc Int : 473 ms

 

NORMAL SINUS RHYTHM

NORMAL ECG

WHEN COMPARED WITH ECG OF 04-DEC-2018 16:32,

NO SIGNIFICANT CHANGE WAS FOUND

Confirmed by CRISTINA PELAEZ MD ([id number]) on 12/6/2018 3:51:58 PM

 

Referred By: MARYLOU JOHNSON DR           Confirmed By:CRISTINA PELAEZ MD

## 2018-12-06 NOTE — PN
Teaching Attending Note


Name of Resident: Hillary Garland





ATTENDING PHYSICIAN STATEMENT





I saw and evaluated the patient.


I reviewed the resident's note and discussed the case with the resident.


I agree with the resident's findings and plan as documented with exceptions 

below.








SUBJECTIVE:


patient seen and examined. No complaints, doing well. 





OBJECTIVE:


 Vital Signs











 Period  Temp  Pulse  Resp  BP Sys/Hui  Pulse Ox


 


 Last 24 Hr  97.6 F-98.4 F  58-70  16-20  111-144/58-82  95-98








 Intake & Output











 12/03/18 12/04/18 12/05/18 12/06/18





 23:59 23:59 23:59 23:59


 


Intake Total   944 416


 


Balance   944 416


 


Weight  165 lb 156 lb 








general: lying in bed in no acute distress


chest; CTAB, no rales or wheezing


Abdomen:soft, NT


Extremities: no edema





 Home Medications











 Medication  Instructions  Recorded


 


Acetaminophen [Tylenol Extra 500 mg PO PRN 05/06/17





Strength]  


 


Amlodipine Besylate [Norvasc -] 2.5 mg PO DAILY 05/06/17


 


Levothyroxine [Synthroid -] 100 mcg PO DAILY 05/06/17


 


Nebivolol HCl [Bystolic] 10 mg DAILY 05/06/17


 


Methyl Salicylate/Menthol Oint 1 applic TP BID #30 applic 05/10/17





[Analgesic [place] -]  








Active Medications





Acetaminophen (Tylenol -)  650 mg PO Q6H PRN


   PRN Reason: PAIN LEVEL 1-5


   Last Admin: 12/05/18 14:37 Dose:  650 mg


Amlodipine Besylate (Norvasc -)  2.5 mg PO DAILY [facility]


Heparin Sodium (Porcine) (Heparin -)  5,000 unit IVPUSH PRN PRN


Heparin Sodium/Dextrose (Heparin Infusion -)  25,000 units in 500 mls @ 20 mls/

hr IVPB TITR JACOB; Protocol


   Last Admin: 12/05/18 18:17 Dose:  900 units/hr, 18 mls/hr


Levothyroxine Sodium (Synthroid -)  75 mcg PO DAILY@0700 [facility]


   Last Admin: 12/06/18 06:55 Dose:  75 mcg


Nebivolol (Bystolic -)  5 mg PO DAILY [facility]





 Laboratory Results - last 24 hr











  12/05/18 12/05/18 12/05/18





  04:30 11:00 16:00


 


PTT (Actin FS)    61.7 H


 


Sodium  140  


 


Potassium  4.0  


 


Chloride  106  


 


Carbon Dioxide  27  


 


Anion Gap  7 L  


 


BUN  15  


 


Creatinine  0.8  


 


Creat Clearance w eGFR  > 60  


 


Random Glucose  96  


 


Calcium  8.5  


 


Phosphorus  3.4  


 


Magnesium  2.2  


 


Creatine Kinase   356 H 


 


Creatine Kinase Index   0.7 


 


CK-MB (CK-2)   2.7 


 


Troponin I  0.09 H  0.05 


 


Triglycerides  118  


 


Cholesterol  210 H  


 


Total LDL Cholesterol  143 H  


 


HDL Cholesterol  45  














  12/05/18 12/06/18





  16:00 05:30


 


PTT (Actin FS)  


 


Sodium   138


 


Potassium   3.7


 


Chloride   105


 


Carbon Dioxide   25


 


Anion Gap   8


 


BUN   14


 


Creatinine   0.8


 


Creat Clearance w eGFR   > 60


 


Random Glucose   96


 


Calcium   7.9 L


 


Phosphorus   3.3


 


Magnesium   2.1


 


Creatine Kinase  


 


Creatine Kinase Index  


 


CK-MB (CK-2)  


 


Troponin I  0.04 


 


Triglycerides  


 


Cholesterol  


 


Total LDL Cholesterol  


 


HDL Cholesterol  








2D echo results reviewed





ASSESSMENT AND PLAN:


89 yof with PMHx of HTN, Hypothyroidism, R Breast CA (S/P lumpectomy)  admitted 

with weakness, mechanical fall and found with elevated troponin





-Weakness


-Mechanical fall (states sat on floor as felt weak, crawled and called EMS, 

denies LOC)


-Elevated Troponin/NSTEMI, low suspicion for ACS given repeat Trop non 

concerning and absence of significant WMA on echo


-HTN


-Hypothroidism


-H/o Right breast ca s/p lumpectomy





Plan:


Cardiology input noted. Heparin drip x 48 hours. 


 repeat trop normal, 2d echo with no WMA making ACS less likely. 


Stress MIBI done, follow up results. 


Hold starting statin given mild CPK elevation. 


Fall precautions, CT head neg


Resume bystolic. Resume norvasc based on BP readings. 


TSH normal, resume levothyroxine


PT eval rec SNF. Discussed with pateint and daughter, want to re-attempt 

working with PT before addressing SNF vs home. 


Patient and daughter prefer that patient be discharged home. 


Dispo in 24 hours if stress test non concerning for ischemia and disposition 

arranged.


Plan discussed with patient and daughter at bedside in detail, all questions 

answered.


Discussed with case management.

## 2018-12-07 VITALS — HEART RATE: 62 BPM | DIASTOLIC BLOOD PRESSURE: 75 MMHG | TEMPERATURE: 97.7 F | SYSTOLIC BLOOD PRESSURE: 172 MMHG

## 2018-12-07 LAB
ANION GAP SERPL CALC-SCNC: 6 MMOL/L (ref 8–16)
BUN SERPL-MCNC: 16 MG/DL (ref 7–18)
CALCIUM SERPL-MCNC: 7.5 MG/DL (ref 8.5–10.1)
CHLORIDE SERPL-SCNC: 104 MMOL/L (ref 98–107)
CO2 SERPL-SCNC: 27 MMOL/L (ref 21–32)
CREAT SERPL-MCNC: 0.8 MG/DL (ref 0.55–1.3)
DEPRECATED RDW RBC AUTO: 13.7 % (ref 11.6–15.6)
GLUCOSE SERPL-MCNC: 86 MG/DL (ref 74–106)
HCT VFR BLD CALC: 37.8 % (ref 32.4–45.2)
HGB BLD-MCNC: 13.3 GM/DL (ref 10.7–15.3)
MAGNESIUM SERPL-MCNC: 2.1 MG/DL (ref 1.8–2.4)
MCH RBC QN AUTO: 30.7 PG (ref 25.7–33.7)
MCHC RBC AUTO-ENTMCNC: 35.3 G/DL (ref 32–36)
MCV RBC: 87.1 FL (ref 80–96)
PHOSPHATE SERPL-MCNC: 3.7 MG/DL (ref 2.5–4.9)
PLATELET # BLD AUTO: 264 K/MM3 (ref 134–434)
PMV BLD: 8.6 FL (ref 7.5–11.1)
POTASSIUM SERPLBLD-SCNC: 3.8 MMOL/L (ref 3.5–5.1)
RBC # BLD AUTO: 4.33 M/MM3 (ref 3.6–5.2)
SODIUM SERPL-SCNC: 137 MMOL/L (ref 136–145)
WBC # BLD AUTO: 7.7 K/MM3 (ref 4–10)

## 2018-12-07 RX ADMIN — AMLODIPINE BESYLATE SCH MG: 2.5 TABLET ORAL at 09:56

## 2018-12-07 RX ADMIN — ACETAMINOPHEN PRN MG: 325 TABLET ORAL at 12:45

## 2018-12-07 RX ADMIN — LEVOTHYROXINE SODIUM SCH MCG: 75 TABLET ORAL at 06:20

## 2018-12-07 NOTE — DS
Physical Exam: 


SUBJECTIVE: Patient seen and examined at bedside- no acute events overnight; 

she is still feeling weak. stress test was negative; she denies any CP/SOB/V 

fevers or chills.








OBJECTIVE:





 Vital Signs











 Period  Temp  Pulse  Resp  BP Sys/Hui  Pulse Ox


 


 Last 24 Hr  97.4 F-98.8 F  60-70  18-20  136-172/74-76  94-95








PHYSICAL EXAM





GENERAL: The patient is awake, alert, and fully oriented, in no acute distress.


HEAD: Normal with no signs of trauma.


EYES: no scleral icterus. 


NECK: no JHVD


LUNGS: CTA B/L; no rales, rhonchi or wheeezing


HEART: Regular rate and rhythm, S1, S2 without murmur, rub or gallop.


ABDOMEN: Soft, nontender, nondistended, normoactive bowel sounds, no guarding, 

no rebound, no hepatosplenomegaly, no masses.


EXTREMITIES: 2+ pulses, warm, well-perfused,  edema. 


PSYCH: Normal mood, normal affect.


SKIN: Warm, dry, normal turgor, no rashes or lesions noted.





LABS


 Laboratory Results - last 24 hr











  12/07/18 12/07/18 12/07/18





  05:30 05:30 05:30


 


WBC  7.7  


 


RBC  4.33  


 


Hgb  13.3  


 


Hct  37.8  


 


MCV  87.1  


 


MCH  30.7  


 


MCHC  35.3  


 


RDW  13.7  


 


Plt Count  264  


 


MPV  8.6  


 


PTT (Actin FS)    61.5 H


 


Sodium   137 


 


Potassium   3.8 


 


Chloride   104 


 


Carbon Dioxide   27 


 


Anion Gap   6 L 


 


BUN   16 


 


Creatinine   0.8 


 


Creat Clearance w eGFR   > 60 


 


Random Glucose   86 


 


Calcium   7.5 L 


 


Phosphorus   3.7 


 


Magnesium   2.1 


 


Creatine Kinase   176 


 


Creatine Kinase Index   1.5 


 


CK-MB (CK-2)   2.7 











HOSPITAL COURSE:





Date of Admission:12/04/18


89 y/.o jarrett presneted to [facility] ED with weakness having a presycnopal 

episode. patient was at home and felt weak so she lowered herself to the ground 

however she did not fall nor she did she lose consciousness. she was vitally 

stable opn admission however had a troponin of 1.12 but no ekg changes and no 

known cardiac histyry. she was started on a hepartrin drip; repeat trops were 

0.09, 0.06 and 0.05. she had a stress test which was negative and a normal 

echo. she was discharged with strict follow up with a cardiologist. 


Date of Discharge: 12/07/18











Minutes to complete discharge: 39





Discharge Summary


Reason For Visit: SYNCOPE


Current Active Problems





NSTEMI (non-ST elevated myocardial infarction) (Acute)


Syncope (Acute)








Condition: Stable





- Instructions


Diet, Activity, Other Instructions: 


You came to the emergency room with complaints of weakness and were also found 

to have an elevated marker of your heart , which has since returned to normal.





Please resume all of your home medications





-Please follow up with Dr. Masters within one week and have your CPK level checked


-Please follow up with Dr. Carias, the cardiologist within one week





We thought that you would benefit from a short term rehab however, you wished 

to go home. You are being sent home with visiting nurses services and we advise 

that you have supervision and assistance with activities at home. 





You will need to be started on cholesterol medication. 


Please have the blood work "CPK" with your doctor in 1 week and discuss being 

started on cholesterol medication if normal. 





*if you begin to experience any weakness, chest pain, shortness of breath, 

nausea or vomiting or any new concenrs, please call 911 or return to the 

emergency room  


Referrals: 


Jan Laird MD [Primary Care Provider] - 1 Week


Ludwig Carias MD [Staff Physician] - 1 Week


Disposition: VNS/HOME HEALTH CARE





- Home Medications


Comprehensive Discharge Medication List: 


Ambulatory Orders





Acetaminophen [Tylenol Extra Strength] 500 mg PO PRN 05/06/17 


Amlodipine Besylate [Norvasc -] 2.5 mg PO DAILY 05/06/17 


Levothyroxine [Synthroid -] 100 mcg PO DAILY 05/06/17 


Nebivolol HCl [Bystolic] 10 mg DAILY 05/06/17 


Methyl Salicylate/Menthol Oint [Analgesic Balm -] 1 applic TP BID #30 applic 05/

10/17 











Problem List





- Problems


(1) NSTEMI (non-ST elevated myocardial infarction)


Code(s): I21.4 - NON-ST ELEVATION (NSTEMI) MYOCARDIAL INFARCTION   





(2) Hypertension


Code(s): I10 - ESSENTIAL (PRIMARY) HYPERTENSION   





(3) Hypothyroidism


Code(s): E03.9 - HYPOTHYROIDISM, UNSPECIFIED   


This patient is new to me today: No


Emergency Visit: Yes


ED Registration Date: 12/04/18


Care time: The patient presented to the Emergency Department on the above date 

and was hospitalized for further evaluation of their emergent condition.


Critical Care patient: No





- Discharge Referral


Referred to [facility] No

## 2018-12-07 NOTE — PN
Teaching Attending Note


Name of Resident: Hillary Garland





ATTENDING PHYSICIAN STATEMENT





I saw and evaluated the patient.


I reviewed the resident's note and discussed the case with the resident.


I agree with the resident's findings and plan as documented with exceptions 

below.








SUBJECTIVE:


Patient seen and examined. Weakness improved, no new chest pain, palpitations, 

dyspnea or concerns. 





OBJECTIVE:


 Vital Signs











 Period  Temp  Pulse  Resp  BP Sys/Hui  Pulse Ox


 


 Last 24 Hr  97.4 F-98.8 F  60-70  18-20  136-172/74-76  94-95








 Intake & Output











 12/04/18 12/05/18 12/06/18 12/07/18





 23:59 23:59 23:59 23:59


 


Intake Total  944 1540 564


 


Output Total   200 


 


Balance  944 1340 564


 


Weight 165 lb 156 lb  








General: sitting in bed in no acute distress


Chest: CTAB, no rales or wheezing


Abdomen:soft, obese, NT


Extremities: no edema





 Home Medications











 Medication  Instructions  Recorded


 


Acetaminophen [Tylenol Extra 500 mg PO PRN 05/06/17





Strength]  


 


Amlodipine Besylate [Norvasc -] 2.5 mg PO DAILY 05/06/17


 


Levothyroxine [Synthroid -] 100 mcg PO DAILY 05/06/17


 


Nebivolol HCl [Bystolic] 10 mg DAILY 05/06/17


 


Methyl Salicylate/Menthol Oint 1 applic TP BID #30 applic 05/10/17





[Analgesic [place] -]  








 Laboratory Results - last 24 hr











  12/07/18 12/07/18 12/07/18





  05:30 05:30 05:30


 


WBC  7.7  


 


RBC  4.33  


 


Hgb  13.3  


 


Hct  37.8  


 


MCV  87.1  


 


MCH  30.7  


 


MCHC  35.3  


 


RDW  13.7  


 


Plt Count  264  


 


MPV  8.6  


 


PTT (Actin FS)    61.5 H


 


Sodium   137 


 


Potassium   3.8 


 


Chloride   104 


 


Carbon Dioxide   27 


 


Anion Gap   6 L 


 


BUN   16 


 


Creatinine   0.8 


 


Creat Clearance w eGFR   > 60 


 


Random Glucose   86 


 


Calcium   7.5 L 


 


Phosphorus   3.7 


 


Magnesium   2.1 


 


Creatine Kinase   176 


 


Creatine Kinase Index   1.5 


 


CK-MB (CK-2)   2.7 








Stress test neg for ischemia





ASSESSMENT AND PLAN:


89 yof with PMHx of HTN, Hypothyroidism, R Breast CA (S/P lumpectomy)  admitted 

with weakness, mechanical fall and found with elevated troponin





-Weakness


-Mechanical fall (states sat on floor as felt weak, crawled and called EMS, 

denies LOC)


-Elevated Troponin/NSTEMI, low suspicion for ACS given repeat Trop non 

concerning and absence of significant WMA on echo


-HTN


-Hypothroidism


-H/o Right breast ca s/p lumpectomy





Plan:


Doing well,


stress test neg for ischemia.


Resume home meds.


Advise patient to discuss being started on statin after follow up CPK in 1 week 

with PCP.


Patient declines SNF and wants to go home. 


Home services arranged.


D/c home with VNS/PT with daughter.


Plan discussed with patient and daughter in detail, all questions answered.